# Patient Record
Sex: FEMALE | Race: WHITE | Employment: OTHER | URBAN - METROPOLITAN AREA
[De-identification: names, ages, dates, MRNs, and addresses within clinical notes are randomized per-mention and may not be internally consistent; named-entity substitution may affect disease eponyms.]

---

## 2017-02-14 DIAGNOSIS — Z12.39 BREAST CANCER SCREENING, HIGH RISK PATIENT: Primary | ICD-10-CM

## 2017-03-10 ENCOUNTER — HOSPITAL ENCOUNTER (OUTPATIENT)
Dept: WOMENS IMAGING | Age: 67
Discharge: HOME | End: 2017-03-10
Payer: MEDICARE

## 2017-03-10 DIAGNOSIS — Z13.9 SCREENING: ICD-10-CM

## 2017-03-10 DIAGNOSIS — Z12.39 BREAST CANCER SCREENING, HIGH RISK PATIENT: ICD-10-CM

## 2017-03-10 PROCEDURE — 77063 BREAST TOMOSYNTHESIS BI: CPT

## 2017-03-14 ENCOUNTER — OFFICE VISIT (OUTPATIENT)
Dept: SURGERY | Age: 67
End: 2017-03-14

## 2017-03-14 VITALS
WEIGHT: 138 LBS | TEMPERATURE: 98.3 F | HEIGHT: 62 IN | SYSTOLIC BLOOD PRESSURE: 140 MMHG | RESPIRATION RATE: 14 BRPM | DIASTOLIC BLOOD PRESSURE: 90 MMHG | BODY MASS INDEX: 25.4 KG/M2 | HEART RATE: 80 BPM

## 2017-03-14 DIAGNOSIS — N60.99 ATYPICAL DUCTAL HYPERPLASIA OF BREAST: Primary | ICD-10-CM

## 2017-03-14 PROCEDURE — 1123F ACP DISCUSS/DSCN MKR DOCD: CPT | Performed by: SURGERY

## 2017-03-14 PROCEDURE — G8484 FLU IMMUNIZE NO ADMIN: HCPCS | Performed by: SURGERY

## 2017-03-14 PROCEDURE — G8400 PT W/DXA NO RESULTS DOC: HCPCS | Performed by: SURGERY

## 2017-03-14 PROCEDURE — 3017F COLORECTAL CA SCREEN DOC REV: CPT | Performed by: SURGERY

## 2017-03-14 PROCEDURE — 1090F PRES/ABSN URINE INCON ASSESS: CPT | Performed by: SURGERY

## 2017-03-14 PROCEDURE — 99213 OFFICE O/P EST LOW 20 MIN: CPT | Performed by: SURGERY

## 2017-03-14 PROCEDURE — 3014F SCREEN MAMMO DOC REV: CPT | Performed by: SURGERY

## 2017-03-14 PROCEDURE — G8427 DOCREV CUR MEDS BY ELIG CLIN: HCPCS | Performed by: SURGERY

## 2017-03-14 PROCEDURE — 4040F PNEUMOC VAC/ADMIN/RCVD: CPT | Performed by: SURGERY

## 2017-03-14 PROCEDURE — G8420 CALC BMI NORM PARAMETERS: HCPCS | Performed by: SURGERY

## 2017-03-14 PROCEDURE — 4004F PT TOBACCO SCREEN RCVD TLK: CPT | Performed by: SURGERY

## 2017-03-14 RX ORDER — FERROUS SULFATE 325(65) MG
325 TABLET ORAL
COMMUNITY
End: 2020-12-07

## 2017-03-14 RX ORDER — SODIUM, POTASSIUM,MAG SULFATES 17.5-3.13G
354 SOLUTION, RECONSTITUTED, ORAL ORAL SEE ADMIN INSTRUCTIONS
Qty: 1 BOTTLE | Refills: 0 | Status: SHIPPED | OUTPATIENT
Start: 2017-03-14 | End: 2020-12-07

## 2017-03-14 ASSESSMENT — ENCOUNTER SYMPTOMS
CONSTIPATION: 0
CHEST TIGHTNESS: 0
COLOR CHANGE: 0
SHORTNESS OF BREATH: 0
EYE PAIN: 0
EYE DISCHARGE: 0
ABDOMINAL PAIN: 0
TROUBLE SWALLOWING: 0
VOMITING: 0
STRIDOR: 0
ANAL BLEEDING: 0
BACK PAIN: 0

## 2017-03-31 ENCOUNTER — HOSPITAL ENCOUNTER (OUTPATIENT)
Dept: PREADMISSION TESTING | Age: 67
Discharge: HOME | End: 2017-03-31
Payer: MEDICARE

## 2017-03-31 VITALS
DIASTOLIC BLOOD PRESSURE: 81 MMHG | SYSTOLIC BLOOD PRESSURE: 135 MMHG | HEIGHT: 63 IN | HEART RATE: 65 BPM | TEMPERATURE: 98.6 F | BODY MASS INDEX: 24.24 KG/M2 | WEIGHT: 136.8 LBS | RESPIRATION RATE: 16 BRPM | OXYGEN SATURATION: 99 %

## 2017-03-31 DIAGNOSIS — Z12.11 SCREENING FOR COLORECTAL CANCER: ICD-10-CM

## 2017-03-31 DIAGNOSIS — Z87.891 FORMER SMOKER, STOPPED SMOKING IN DISTANT PAST: ICD-10-CM

## 2017-03-31 DIAGNOSIS — N60.99 ATYPICAL DUCTAL HYPERPLASIA OF BREAST: ICD-10-CM

## 2017-03-31 DIAGNOSIS — Z12.12 SCREENING FOR COLORECTAL CANCER: ICD-10-CM

## 2017-03-31 PROBLEM — E78.2 COMBINED FAT AND CARBOHYDRATE INDUCED HYPERLIPEMIA: Status: ACTIVE | Noted: 2017-03-31

## 2017-03-31 PROBLEM — M81.0 OSTEOPOROSIS: Status: ACTIVE | Noted: 2017-03-31

## 2017-03-31 LAB
ANION GAP SERPL CALCULATED.3IONS-SCNC: 11 MEQ/L (ref 7–13)
BUN BLDV-MCNC: 13 MG/DL (ref 8–23)
CALCIUM SERPL-MCNC: 9.7 MG/DL (ref 8.6–10.2)
CHLORIDE BLD-SCNC: 105 MEQ/L (ref 98–107)
CO2: 26 MEQ/L (ref 22–29)
CREAT SERPL-MCNC: 0.91 MG/DL (ref 0.5–0.9)
GFR AFRICAN AMERICAN: >60
GFR NON-AFRICAN AMERICAN: >60
GLUCOSE BLD-MCNC: 82 MG/DL (ref 74–109)
HCT VFR BLD CALC: 39.6 % (ref 37–47)
HEMOGLOBIN: 13.2 G/DL (ref 12–16)
MCH RBC QN AUTO: 29.9 PG (ref 27–31.3)
MCHC RBC AUTO-ENTMCNC: 33.4 % (ref 33–37)
MCV RBC AUTO: 89.6 FL (ref 82–100)
PDW BLD-RTO: 14.4 % (ref 11.5–14.5)
PLATELET # BLD: 242 K/UL (ref 130–400)
POTASSIUM SERPL-SCNC: 4.3 MEQ/L (ref 3.5–5.1)
RBC # BLD: 4.42 M/UL (ref 4.2–5.4)
SODIUM BLD-SCNC: 142 MEQ/L (ref 132–144)
WBC # BLD: 6.3 K/UL (ref 4.8–10.8)

## 2017-03-31 PROCEDURE — 80048 BASIC METABOLIC PNL TOTAL CA: CPT

## 2017-03-31 PROCEDURE — 93005 ELECTROCARDIOGRAM TRACING: CPT

## 2017-03-31 PROCEDURE — 85027 COMPLETE CBC AUTOMATED: CPT

## 2017-03-31 RX ORDER — ECHINACEA 400 MG
CAPSULE ORAL
Status: ON HOLD | COMMUNITY
Start: 2008-01-10 | End: 2020-12-16 | Stop reason: HOSPADM

## 2017-03-31 RX ORDER — SODIUM CHLORIDE 0.9 % (FLUSH) 0.9 %
10 SYRINGE (ML) INJECTION PRN
Status: CANCELLED | OUTPATIENT
Start: 2017-03-31

## 2017-03-31 RX ORDER — ASCORBATE CALCIUM 500 MG
TABLET ORAL
COMMUNITY
Start: 2006-12-21 | End: 2020-12-07

## 2017-03-31 RX ORDER — FAMOTIDINE 20 MG
TABLET ORAL
Status: ON HOLD | COMMUNITY
Start: 2008-01-10 | End: 2020-12-16 | Stop reason: HOSPADM

## 2017-03-31 RX ORDER — SODIUM CHLORIDE, SODIUM LACTATE, POTASSIUM CHLORIDE, CALCIUM CHLORIDE 600; 310; 30; 20 MG/100ML; MG/100ML; MG/100ML; MG/100ML
INJECTION, SOLUTION INTRAVENOUS CONTINUOUS
Status: CANCELLED | OUTPATIENT
Start: 2017-03-31

## 2017-03-31 RX ORDER — MULTIVIT WITH MINERALS/LUTEIN
TABLET ORAL
Status: ON HOLD | COMMUNITY
Start: 2006-12-21 | End: 2020-12-16 | Stop reason: HOSPADM

## 2017-03-31 RX ORDER — LIDOCAINE HYDROCHLORIDE 10 MG/ML
1 INJECTION, SOLUTION EPIDURAL; INFILTRATION; INTRACAUDAL; PERINEURAL
Status: CANCELLED | OUTPATIENT
Start: 2017-03-31 | End: 2017-03-31

## 2017-03-31 RX ORDER — SODIUM CHLORIDE 0.9 % (FLUSH) 0.9 %
10 SYRINGE (ML) INJECTION EVERY 12 HOURS SCHEDULED
Status: CANCELLED | OUTPATIENT
Start: 2017-03-31

## 2017-04-03 LAB
EKG ATRIAL RATE: 56 BPM
EKG P AXIS: 55 DEGREES
EKG P-R INTERVAL: 140 MS
EKG Q-T INTERVAL: 428 MS
EKG QRS DURATION: 82 MS
EKG QTC CALCULATION (BAZETT): 413 MS
EKG R AXIS: 16 DEGREES
EKG T AXIS: 46 DEGREES
EKG VENTRICULAR RATE: 56 BPM

## 2017-04-12 ENCOUNTER — ANESTHESIA (OUTPATIENT)
Dept: OPERATING ROOM | Age: 67
End: 2017-04-12
Payer: MEDICARE

## 2017-04-12 ENCOUNTER — HOSPITAL ENCOUNTER (OUTPATIENT)
Age: 67
Setting detail: OUTPATIENT SURGERY
Discharge: HOME | End: 2017-04-12
Attending: SURGERY | Admitting: SURGERY
Payer: MEDICARE

## 2017-04-12 ENCOUNTER — ANESTHESIA EVENT (OUTPATIENT)
Dept: OPERATING ROOM | Age: 67
End: 2017-04-12
Payer: MEDICARE

## 2017-04-12 VITALS
SYSTOLIC BLOOD PRESSURE: 107 MMHG | DIASTOLIC BLOOD PRESSURE: 59 MMHG | OXYGEN SATURATION: 100 % | RESPIRATION RATE: 20 BRPM

## 2017-04-12 VITALS
HEART RATE: 76 BPM | OXYGEN SATURATION: 98 % | DIASTOLIC BLOOD PRESSURE: 82 MMHG | TEMPERATURE: 99 F | RESPIRATION RATE: 20 BRPM | SYSTOLIC BLOOD PRESSURE: 123 MMHG

## 2017-04-12 PROCEDURE — 3700000000 HC ANESTHESIA ATTENDED CARE: Performed by: SURGERY

## 2017-04-12 PROCEDURE — 7100000010 HC PHASE II RECOVERY - FIRST 15 MIN: Performed by: SURGERY

## 2017-04-12 PROCEDURE — 2500000003 HC RX 250 WO HCPCS: Performed by: STUDENT IN AN ORGANIZED HEALTH CARE EDUCATION/TRAINING PROGRAM

## 2017-04-12 PROCEDURE — 7100000011 HC PHASE II RECOVERY - ADDTL 15 MIN: Performed by: SURGERY

## 2017-04-12 PROCEDURE — 6360000002 HC RX W HCPCS: Performed by: NURSE ANESTHETIST, CERTIFIED REGISTERED

## 2017-04-12 PROCEDURE — 2500000003 HC RX 250 WO HCPCS: Performed by: NURSE ANESTHETIST, CERTIFIED REGISTERED

## 2017-04-12 PROCEDURE — 2580000003 HC RX 258: Performed by: STUDENT IN AN ORGANIZED HEALTH CARE EDUCATION/TRAINING PROGRAM

## 2017-04-12 PROCEDURE — 3700000001 HC ADD 15 MINUTES (ANESTHESIA): Performed by: SURGERY

## 2017-04-12 PROCEDURE — 3609027000 HC COLONOSCOPY: Performed by: SURGERY

## 2017-04-12 PROCEDURE — 2580000003 HC RX 258: Performed by: SURGERY

## 2017-04-12 RX ORDER — LIDOCAINE HYDROCHLORIDE 20 MG/ML
INJECTION, SOLUTION INFILTRATION; PERINEURAL PRN
Status: DISCONTINUED | OUTPATIENT
Start: 2017-04-12 | End: 2017-04-12 | Stop reason: SDUPTHER

## 2017-04-12 RX ORDER — PROPOFOL 10 MG/ML
INJECTION, EMULSION INTRAVENOUS PRN
Status: DISCONTINUED | OUTPATIENT
Start: 2017-04-12 | End: 2017-04-12 | Stop reason: SDUPTHER

## 2017-04-12 RX ORDER — ONDANSETRON 2 MG/ML
4 INJECTION INTRAMUSCULAR; INTRAVENOUS
Status: DISCONTINUED | OUTPATIENT
Start: 2017-04-12 | End: 2017-04-12 | Stop reason: HOSPADM

## 2017-04-12 RX ORDER — SODIUM CHLORIDE, SODIUM LACTATE, POTASSIUM CHLORIDE, CALCIUM CHLORIDE 600; 310; 30; 20 MG/100ML; MG/100ML; MG/100ML; MG/100ML
INJECTION, SOLUTION INTRAVENOUS CONTINUOUS
Status: DISCONTINUED | OUTPATIENT
Start: 2017-04-12 | End: 2017-04-12 | Stop reason: HOSPADM

## 2017-04-12 RX ORDER — SODIUM CHLORIDE 0.9 % (FLUSH) 0.9 %
10 SYRINGE (ML) INJECTION PRN
Status: DISCONTINUED | OUTPATIENT
Start: 2017-04-12 | End: 2017-04-12 | Stop reason: HOSPADM

## 2017-04-12 RX ORDER — LIDOCAINE HYDROCHLORIDE 10 MG/ML
1 INJECTION, SOLUTION EPIDURAL; INFILTRATION; INTRACAUDAL; PERINEURAL
Status: COMPLETED | OUTPATIENT
Start: 2017-04-12 | End: 2017-04-12

## 2017-04-12 RX ORDER — SODIUM CHLORIDE 0.9 % (FLUSH) 0.9 %
10 SYRINGE (ML) INJECTION EVERY 12 HOURS SCHEDULED
Status: DISCONTINUED | OUTPATIENT
Start: 2017-04-12 | End: 2017-04-12 | Stop reason: HOSPADM

## 2017-04-12 RX ORDER — MAGNESIUM HYDROXIDE 1200 MG/15ML
LIQUID ORAL PRN
Status: DISCONTINUED | OUTPATIENT
Start: 2017-04-12 | End: 2017-04-12 | Stop reason: HOSPADM

## 2017-04-12 RX ORDER — DEXTROSE, SODIUM CHLORIDE, SODIUM LACTATE, POTASSIUM CHLORIDE, AND CALCIUM CHLORIDE 5; .6; .31; .03; .02 G/100ML; G/100ML; G/100ML; G/100ML; G/100ML
INJECTION, SOLUTION INTRAVENOUS CONTINUOUS
Status: CANCELLED | OUTPATIENT
Start: 2017-04-12

## 2017-04-12 RX ADMIN — PROPOFOL 30 MG: 10 INJECTION, EMULSION INTRAVENOUS at 11:13

## 2017-04-12 RX ADMIN — PROPOFOL 20 MG: 10 INJECTION, EMULSION INTRAVENOUS at 11:23

## 2017-04-12 RX ADMIN — PROPOFOL 20 MG: 10 INJECTION, EMULSION INTRAVENOUS at 11:25

## 2017-04-12 RX ADMIN — PROPOFOL 30 MG: 10 INJECTION, EMULSION INTRAVENOUS at 11:11

## 2017-04-12 RX ADMIN — LIDOCAINE HYDROCHLORIDE 0.1 ML: 10 INJECTION, SOLUTION EPIDURAL; INFILTRATION; INTRACAUDAL; PERINEURAL at 09:51

## 2017-04-12 RX ADMIN — PROPOFOL 20 MG: 10 INJECTION, EMULSION INTRAVENOUS at 11:15

## 2017-04-12 RX ADMIN — PROPOFOL 40 MG: 10 INJECTION, EMULSION INTRAVENOUS at 11:09

## 2017-04-12 RX ADMIN — PROPOFOL 20 MG: 10 INJECTION, EMULSION INTRAVENOUS at 11:21

## 2017-04-12 RX ADMIN — PROPOFOL 20 MG: 10 INJECTION, EMULSION INTRAVENOUS at 11:19

## 2017-04-12 RX ADMIN — PROPOFOL 20 MG: 10 INJECTION, EMULSION INTRAVENOUS at 11:27

## 2017-04-12 RX ADMIN — LIDOCAINE HYDROCHLORIDE 50 MG: 20 INJECTION, SOLUTION INFILTRATION; PERINEURAL at 11:09

## 2017-04-12 RX ADMIN — SODIUM CHLORIDE, POTASSIUM CHLORIDE, SODIUM LACTATE AND CALCIUM CHLORIDE: 600; 310; 30; 20 INJECTION, SOLUTION INTRAVENOUS at 09:51

## 2017-04-12 RX ADMIN — PROPOFOL 20 MG: 10 INJECTION, EMULSION INTRAVENOUS at 11:17

## 2017-04-12 ASSESSMENT — PAIN SCALES - GENERAL
PAINLEVEL_OUTOF10: 0

## 2017-04-12 ASSESSMENT — PAIN - FUNCTIONAL ASSESSMENT: PAIN_FUNCTIONAL_ASSESSMENT: 0-10

## 2018-03-12 ENCOUNTER — HOSPITAL ENCOUNTER (OUTPATIENT)
Dept: WOMENS IMAGING | Age: 68
Discharge: HOME | End: 2018-03-14
Payer: MEDICARE

## 2018-03-12 DIAGNOSIS — N60.99 ATYPICAL DUCTAL HYPERPLASIA OF BREAST: ICD-10-CM

## 2018-03-12 DIAGNOSIS — Z12.31 ENCOUNTER FOR SCREENING MAMMOGRAM FOR BREAST CANCER: ICD-10-CM

## 2018-03-12 DIAGNOSIS — Z12.39 SCREENING BREAST EXAMINATION: Primary | ICD-10-CM

## 2018-03-12 PROCEDURE — 77067 SCR MAMMO BI INCL CAD: CPT

## 2018-03-12 NOTE — PROGRESS NOTES
Jesse Flores called stating pt's screening Dx was missing. New order made - pt at facility at time of call.

## 2018-03-15 ENCOUNTER — OFFICE VISIT (OUTPATIENT)
Dept: SURGERY | Age: 68
End: 2018-03-15
Payer: MEDICARE

## 2018-03-15 VITALS
DIASTOLIC BLOOD PRESSURE: 80 MMHG | BODY MASS INDEX: 24.69 KG/M2 | TEMPERATURE: 97.8 F | SYSTOLIC BLOOD PRESSURE: 139 MMHG | WEIGHT: 137.2 LBS | HEART RATE: 62 BPM

## 2018-03-15 DIAGNOSIS — Z12.39 SCREENING BREAST EXAMINATION: Primary | ICD-10-CM

## 2018-03-15 DIAGNOSIS — N60.99 ATYPICAL DUCTAL HYPERPLASIA OF BREAST: Primary | ICD-10-CM

## 2018-03-15 PROCEDURE — G8400 PT W/DXA NO RESULTS DOC: HCPCS | Performed by: SURGERY

## 2018-03-15 PROCEDURE — 4040F PNEUMOC VAC/ADMIN/RCVD: CPT | Performed by: SURGERY

## 2018-03-15 PROCEDURE — G8484 FLU IMMUNIZE NO ADMIN: HCPCS | Performed by: SURGERY

## 2018-03-15 PROCEDURE — G8427 DOCREV CUR MEDS BY ELIG CLIN: HCPCS | Performed by: SURGERY

## 2018-03-15 PROCEDURE — 1123F ACP DISCUSS/DSCN MKR DOCD: CPT | Performed by: SURGERY

## 2018-03-15 PROCEDURE — 3014F SCREEN MAMMO DOC REV: CPT | Performed by: SURGERY

## 2018-03-15 PROCEDURE — 3017F COLORECTAL CA SCREEN DOC REV: CPT | Performed by: SURGERY

## 2018-03-15 PROCEDURE — G8420 CALC BMI NORM PARAMETERS: HCPCS | Performed by: SURGERY

## 2018-03-15 PROCEDURE — 1090F PRES/ABSN URINE INCON ASSESS: CPT | Performed by: SURGERY

## 2018-03-15 PROCEDURE — 1036F TOBACCO NON-USER: CPT | Performed by: SURGERY

## 2018-03-15 PROCEDURE — 99213 OFFICE O/P EST LOW 20 MIN: CPT | Performed by: SURGERY

## 2018-03-15 ASSESSMENT — ENCOUNTER SYMPTOMS
SHORTNESS OF BREATH: 0
CONSTIPATION: 0
VOMITING: 0
COLOR CHANGE: 0
BACK PAIN: 0
TROUBLE SWALLOWING: 0
STRIDOR: 0
EYE DISCHARGE: 0
EYE PAIN: 0
ABDOMINAL PAIN: 0
ANAL BLEEDING: 0
CHEST TIGHTNESS: 0

## 2018-03-15 NOTE — PROGRESS NOTES
Problem Relation Age of Onset    Dementia Mother     High Blood Pressure Mother     High Blood Pressure Father     High Cholesterol Father     Emphysema Father     Cancer Father      lung    No Known Problems Sister          Current Outpatient Prescriptions   Medication Sig Dispense Refill    Multiple Vitamins-Minerals (CENTRUM SILVER) TABS Take by mouth      Vitamin D, Cholecalciferol, 1000 UNITS CAPS Take by mouth      Flaxseed, Linseed, (FLAXSEED OIL) 1000 MG CAPS Take by mouth      Calcium Ascorbate 500 MG TABS Take by mouth      oxybutynin (DITROPAN) 5 MG tablet Take 5 mg by mouth 3 times daily      olmesartan (BENICAR) 20 MG tablet Take 20 mg by mouth daily.  pantoprazole (PROTONIX) 40 MG tablet Take 40 mg by mouth daily.  atorvastatin (LIPITOR) 20 MG tablet Take 20 mg by mouth daily.  Calcium Carb-Cholecalciferol (CALCIUM + D3) 600-200 MG-UNIT TABS tablet Take by mouth      ferrous sulfate (IRON SUPPLEMENT) 325 (65 FE) MG tablet Take 325 mg by mouth daily (with breakfast)      Na Sulfate-K Sulfate-Mg Sulf (SUPREP BOWEL PREP) SOLN Take 354 mLs by mouth See Admin Instructions Take one kit for prep for one day. 1 Bottle 0     No current facility-administered medications for this visit. Allergies   Allergen Reactions    Demerol Other (See Comments)     Vomiting    Medrol [Methylprednisolone] Itching    Bactrim [Sulfamethoxazole-Trimethoprim] Rash     Facial flushing , blotches on chest    Ciprofloxacin Rash    Oxycodone-Acetaminophen Rash    Sulfa Antibiotics Rash         Review of Systems   Constitutional: Negative for chills, fatigue, fever and unexpected weight change. HENT: Negative for nosebleeds and trouble swallowing. Eyes: Negative for pain and discharge. Respiratory: Negative for chest tightness, shortness of breath and stridor. Cardiovascular: Negative for chest pain, palpitations and leg swelling.    Gastrointestinal: Negative for abdominal

## 2018-09-26 PROBLEM — Z12.12 SCREENING FOR COLORECTAL CANCER: Status: RESOLVED | Noted: 2017-03-31 | Resolved: 2018-09-26

## 2018-09-26 PROBLEM — Z12.11 SCREENING FOR COLORECTAL CANCER: Status: RESOLVED | Noted: 2017-03-31 | Resolved: 2018-09-26

## 2020-12-03 ENCOUNTER — HOSPITAL ENCOUNTER (EMERGENCY)
Age: 70
Discharge: HOME | End: 2020-12-03
Payer: MEDICARE

## 2020-12-03 ENCOUNTER — APPOINTMENT (OUTPATIENT)
Dept: CT IMAGING | Age: 70
End: 2020-12-03
Payer: MEDICARE

## 2020-12-03 VITALS
HEART RATE: 68 BPM | DIASTOLIC BLOOD PRESSURE: 58 MMHG | BODY MASS INDEX: 25.34 KG/M2 | TEMPERATURE: 97.6 F | RESPIRATION RATE: 20 BRPM | WEIGHT: 143 LBS | HEIGHT: 63 IN | SYSTOLIC BLOOD PRESSURE: 121 MMHG | OXYGEN SATURATION: 99 %

## 2020-12-03 LAB
ALBUMIN SERPL-MCNC: 4.3 G/DL (ref 3.5–4.6)
ALP BLD-CCNC: 98 U/L (ref 40–130)
ALT SERPL-CCNC: 14 U/L (ref 0–33)
ANION GAP SERPL CALCULATED.3IONS-SCNC: 15 MEQ/L (ref 9–15)
AST SERPL-CCNC: 23 U/L (ref 0–35)
BACTERIA: NEGATIVE /HPF
BASOPHILS ABSOLUTE: 0.1 K/UL (ref 0–0.2)
BASOPHILS RELATIVE PERCENT: 0.7 %
BILIRUB SERPL-MCNC: 0.3 MG/DL (ref 0.2–0.7)
BILIRUBIN URINE: NEGATIVE
BLOOD, URINE: NEGATIVE
BUN BLDV-MCNC: 13 MG/DL (ref 8–23)
CALCIUM SERPL-MCNC: 9.2 MG/DL (ref 8.5–9.9)
CHLORIDE BLD-SCNC: 105 MEQ/L (ref 95–107)
CLARITY: ABNORMAL
CO2: 21 MEQ/L (ref 20–31)
COLOR: YELLOW
CREAT SERPL-MCNC: 0.87 MG/DL (ref 0.5–0.9)
EOSINOPHILS ABSOLUTE: 0.1 K/UL (ref 0–0.7)
EOSINOPHILS RELATIVE PERCENT: 1.6 %
EPITHELIAL CELLS, UA: ABNORMAL /HPF (ref 0–5)
GFR AFRICAN AMERICAN: >60
GFR AFRICAN AMERICAN: >60
GFR NON-AFRICAN AMERICAN: >60
GFR NON-AFRICAN AMERICAN: >60
GLOBULIN: 2.8 G/DL (ref 2.3–3.5)
GLUCOSE BLD-MCNC: 119 MG/DL (ref 70–99)
GLUCOSE URINE: NEGATIVE MG/DL
HCT VFR BLD CALC: 37.7 % (ref 37–47)
HEMOGLOBIN: 13.2 G/DL (ref 12–16)
HYALINE CASTS: ABNORMAL /HPF (ref 0–5)
KETONES, URINE: NEGATIVE MG/DL
LACTIC ACID: 3.9 MMOL/L (ref 0.5–2.2)
LEUKOCYTE ESTERASE, URINE: ABNORMAL
LIPASE: 50 U/L (ref 12–95)
LYMPHOCYTES ABSOLUTE: 3 K/UL (ref 1–4.8)
LYMPHOCYTES RELATIVE PERCENT: 42.7 %
MCH RBC QN AUTO: 31.8 PG (ref 27–31.3)
MCHC RBC AUTO-ENTMCNC: 34.9 % (ref 33–37)
MCV RBC AUTO: 91.1 FL (ref 82–100)
MONOCYTES ABSOLUTE: 0.6 K/UL (ref 0.2–0.8)
MONOCYTES RELATIVE PERCENT: 8.7 %
NEUTROPHILS ABSOLUTE: 3.2 K/UL (ref 1.4–6.5)
NEUTROPHILS RELATIVE PERCENT: 46.3 %
NITRITE, URINE: NEGATIVE
PDW BLD-RTO: 13.7 % (ref 11.5–14.5)
PERFORMED ON: NORMAL
PH UA: >=9 (ref 5–9)
PLATELET # BLD: 261 K/UL (ref 130–400)
POC CREATININE: 0.9 MG/DL (ref 0.6–1.2)
POC SAMPLE TYPE: NORMAL
POTASSIUM SERPL-SCNC: 3.4 MEQ/L (ref 3.4–4.9)
PROTEIN UA: ABNORMAL MG/DL
RBC # BLD: 4.14 M/UL (ref 4.2–5.4)
SODIUM BLD-SCNC: 141 MEQ/L (ref 135–144)
SPECIFIC GRAVITY UA: 1.02 (ref 1–1.03)
TOTAL PROTEIN: 7.1 G/DL (ref 6.3–8)
URINE REFLEX TO CULTURE: YES
UROBILINOGEN, URINE: 0.2 E.U./DL
WBC # BLD: 6.9 K/UL (ref 4.8–10.8)
WBC UA: ABNORMAL /HPF (ref 0–5)

## 2020-12-03 PROCEDURE — 87086 URINE CULTURE/COLONY COUNT: CPT

## 2020-12-03 PROCEDURE — 96375 TX/PRO/DX INJ NEW DRUG ADDON: CPT

## 2020-12-03 PROCEDURE — 83690 ASSAY OF LIPASE: CPT

## 2020-12-03 PROCEDURE — 80053 COMPREHEN METABOLIC PANEL: CPT

## 2020-12-03 PROCEDURE — 6360000002 HC RX W HCPCS: Performed by: PHYSICIAN ASSISTANT

## 2020-12-03 PROCEDURE — 6360000004 HC RX CONTRAST MEDICATION: Performed by: PHYSICIAN ASSISTANT

## 2020-12-03 PROCEDURE — 36415 COLL VENOUS BLD VENIPUNCTURE: CPT

## 2020-12-03 PROCEDURE — 96376 TX/PRO/DX INJ SAME DRUG ADON: CPT

## 2020-12-03 PROCEDURE — 2580000003 HC RX 258: Performed by: PHYSICIAN ASSISTANT

## 2020-12-03 PROCEDURE — 85025 COMPLETE CBC W/AUTO DIFF WBC: CPT

## 2020-12-03 PROCEDURE — 96374 THER/PROPH/DIAG INJ IV PUSH: CPT

## 2020-12-03 PROCEDURE — 99285 EMERGENCY DEPT VISIT HI MDM: CPT

## 2020-12-03 PROCEDURE — 74177 CT ABD & PELVIS W/CONTRAST: CPT

## 2020-12-03 PROCEDURE — 83605 ASSAY OF LACTIC ACID: CPT

## 2020-12-03 PROCEDURE — 2500000003 HC RX 250 WO HCPCS: Performed by: PHYSICIAN ASSISTANT

## 2020-12-03 PROCEDURE — 81003 URINALYSIS AUTO W/O SCOPE: CPT

## 2020-12-03 RX ORDER — ONDANSETRON 4 MG/1
4 TABLET, ORALLY DISINTEGRATING ORAL EVERY 8 HOURS PRN
Qty: 20 TABLET | Refills: 0 | Status: SHIPPED | OUTPATIENT
Start: 2020-12-03

## 2020-12-03 RX ORDER — 0.9 % SODIUM CHLORIDE 0.9 %
1000 INTRAVENOUS SOLUTION INTRAVENOUS ONCE
Status: COMPLETED | OUTPATIENT
Start: 2020-12-03 | End: 2020-12-03

## 2020-12-03 RX ORDER — MORPHINE SULFATE 4 MG/ML
4 INJECTION, SOLUTION INTRAMUSCULAR; INTRAVENOUS ONCE
Status: COMPLETED | OUTPATIENT
Start: 2020-12-03 | End: 2020-12-03

## 2020-12-03 RX ORDER — ONDANSETRON 2 MG/ML
4 INJECTION INTRAMUSCULAR; INTRAVENOUS ONCE
Status: COMPLETED | OUTPATIENT
Start: 2020-12-03 | End: 2020-12-03

## 2020-12-03 RX ORDER — KETOROLAC TROMETHAMINE 30 MG/ML
30 INJECTION, SOLUTION INTRAMUSCULAR; INTRAVENOUS ONCE
Status: COMPLETED | OUTPATIENT
Start: 2020-12-03 | End: 2020-12-03

## 2020-12-03 RX ORDER — NAPROXEN 500 MG/1
500 TABLET ORAL 2 TIMES DAILY
Qty: 14 TABLET | Refills: 0 | Status: SHIPPED | OUTPATIENT
Start: 2020-12-03 | End: 2020-12-07

## 2020-12-03 RX ORDER — HYDROCODONE BITARTRATE AND ACETAMINOPHEN 5; 325 MG/1; MG/1
1 TABLET ORAL EVERY 6 HOURS PRN
Qty: 10 TABLET | Refills: 0 | Status: SHIPPED | OUTPATIENT
Start: 2020-12-03 | End: 2020-12-06

## 2020-12-03 RX ADMIN — ONDANSETRON 4 MG: 2 INJECTION INTRAMUSCULAR; INTRAVENOUS at 16:22

## 2020-12-03 RX ADMIN — SODIUM CHLORIDE 1000 ML: 9 INJECTION, SOLUTION INTRAVENOUS at 16:22

## 2020-12-03 RX ADMIN — MORPHINE SULFATE 4 MG: 4 INJECTION, SOLUTION INTRAMUSCULAR; INTRAVENOUS at 16:28

## 2020-12-03 RX ADMIN — IOPAMIDOL 100 ML: 612 INJECTION, SOLUTION INTRAVENOUS at 17:32

## 2020-12-03 RX ADMIN — MORPHINE SULFATE 4 MG: 4 INJECTION, SOLUTION INTRAMUSCULAR; INTRAVENOUS at 16:22

## 2020-12-03 RX ADMIN — KETOROLAC TROMETHAMINE 30 MG: 30 INJECTION, SOLUTION INTRAMUSCULAR at 16:22

## 2020-12-03 RX ADMIN — FAMOTIDINE 20 MG: 10 INJECTION INTRAVENOUS at 16:46

## 2020-12-03 ASSESSMENT — PAIN DESCRIPTION - DESCRIPTORS
DESCRIPTORS: ACHING
DESCRIPTORS: DISCOMFORT

## 2020-12-03 ASSESSMENT — PAIN DESCRIPTION - LOCATION
LOCATION: BACK;ABDOMEN;FLANK
LOCATION: ABDOMEN

## 2020-12-03 ASSESSMENT — ENCOUNTER SYMPTOMS
SHORTNESS OF BREATH: 0
ALLERGIC/IMMUNOLOGIC NEGATIVE: 1
ABDOMINAL PAIN: 1
VOMITING: 1
COLOR CHANGE: 0
TROUBLE SWALLOWING: 0
APNEA: 0
NAUSEA: 1
BACK PAIN: 1
EYE PAIN: 0

## 2020-12-03 ASSESSMENT — PAIN DESCRIPTION - FREQUENCY
FREQUENCY: CONTINUOUS
FREQUENCY: CONTINUOUS

## 2020-12-03 ASSESSMENT — PAIN SCALES - GENERAL
PAINLEVEL_OUTOF10: 9
PAINLEVEL_OUTOF10: 4

## 2020-12-03 ASSESSMENT — PAIN DESCRIPTION - PAIN TYPE
TYPE: ACUTE PAIN
TYPE: ACUTE PAIN

## 2020-12-03 ASSESSMENT — PAIN DESCRIPTION - ONSET
ONSET: ON-GOING
ONSET: ON-GOING

## 2020-12-03 ASSESSMENT — PAIN DESCRIPTION - PROGRESSION
CLINICAL_PROGRESSION: GRADUALLY IMPROVING
CLINICAL_PROGRESSION: GRADUALLY WORSENING

## 2020-12-03 ASSESSMENT — PAIN DESCRIPTION - ORIENTATION
ORIENTATION: RIGHT
ORIENTATION: RIGHT

## 2020-12-03 ASSESSMENT — PAIN SCALES - WONG BAKER: WONGBAKER_NUMERICALRESPONSE: 0

## 2020-12-03 NOTE — ED TRIAGE NOTES
Patient arrived from home via [facility] with complaint of abd pain x1 hour radiating to right flank and right back. Patient A&OX4. Skin pink, warm, and dry. msp intact. Patient denies chest pain and sob.  Patient actively vomiting upon arrival.

## 2020-12-03 NOTE — ED NOTES
Patient amb to bathroom with steady gait. Urine sample obtained and sent to lab.       Ayala Juarez RN  12/03/20 5050

## 2020-12-03 NOTE — ED PROVIDER NOTES
[facility] ED  eMERGENCYdEPARTMENT eNCOUnter      Pt Name: Angela Beltre  MRN: [id number]  [place] 1950  Date of evaluation: 12/3/2020  Provider:Fuad Weller PA-C    CHIEF COMPLAINT       Chief Complaint   Patient presents with    Flank Pain     right side flank pain, abd pain, and back pain x1 hour. with emesis. HISTORY OF PRESENT ILLNESS  (Location/Symptom, Timing/Onset, Context/Setting, Quality, Duration, Modifying Factors, Severity.)   Angela Beltre is a 79 y.o. female who presents to the emergency department with complaints of diffuse right-sided abdominal pain that radiates into the right flank and back, ongoing since approximately 3:30 PM.  Patient states that the pain with sudden onset and associated with severe nausea. Patient did have emesis prior to coming to the emergency room. Patient points to the diffuse right side of the abdomen as the location of her pain. Patient describes it as a sharp and cramping type of pain. Patient denies any recent chest pain, palpitations or shortness of breath. Patient is vomiting on initial ED assessment    HPI    Nursing Notes were reviewed and I agree. REVIEW OF SYSTEMS    (2-9 systems for level 4, 10 or more for level 5)     Review of Systems   Constitutional: Negative for diaphoresis and fever. HENT: Negative for hearing loss and trouble swallowing. Eyes: Negative for pain. Respiratory: Negative for apnea and shortness of breath. Cardiovascular: Negative for chest pain. Gastrointestinal: Positive for abdominal pain, nausea and vomiting. Endocrine: Negative. Genitourinary: Positive for flank pain. Negative for hematuria. Musculoskeletal: Positive for back pain. Negative for neck pain and neck stiffness. Skin: Negative for color change. Allergic/Immunologic: Negative. Neurological: Negative for dizziness and numbness. Hematological: Negative. Psychiatric/Behavioral: Negative.     All other systems reviewed and are negative. Except as noted above the remainder of the review of systems was reviewed and negative. PAST MEDICAL HISTORY     Past Medical History:   Diagnosis Date    Arthritis     Hyperlipidemia     meds > 10 yrs    Hypertension     meds > 10 yrs / denies TIA or stroke         SURGICAL HISTORY       Past Surgical History:   Procedure Laterality Date    BREAST SURGERY      biopsy right breast    COLONOSCOPY      2 previous colonscopy polyps    COLONOSCOPY  04/12/2017    DR [place]    DILATION AND CURETTAGE OF UTERUS      endometriosis / several D & C    IA COLON CA SCRN NOT HI RSK IND N/A 4/12/2017    COLONOSCOPY, PAT DONE  performed by Daisy Caballero MD at [address] [place] [address] SURGERY Right     big toe spur    TONSILLECTOMY           CURRENT MEDICATIONS       Previous Medications    ATORVASTATIN (LIPITOR) 20 MG TABLET    Take 20 mg by mouth daily. CALCIUM ASCORBATE 500 MG TABS    Take by mouth    CALCIUM CARB-CHOLECALCIFEROL (CALCIUM + D3) 600-200 MG-UNIT TABS TABLET    Take by mouth    FERROUS SULFATE (IRON SUPPLEMENT) 325 (65 FE) MG TABLET    Take 325 mg by mouth daily (with breakfast)    FLAXSEED, LINSEED, (FLAXSEED OIL) 1000 MG CAPS    Take by mouth    MULTIPLE VITAMINS-MINERALS (CENTRUM SILVER) TABS    Take by mouth    NA SULFATE-K SULFATE-MG SULF (SUPREP BOWEL PREP) SOLN    Take 354 mLs by mouth See Admin Instructions Take one kit for prep for one day. OLMESARTAN (BENICAR) 20 MG TABLET    Take 20 mg by mouth daily. OXYBUTYNIN (DITROPAN) 5 MG TABLET    Take 5 mg by mouth 3 times daily    PANTOPRAZOLE (PROTONIX) 40 MG TABLET    Take 40 mg by mouth daily. VITAMIN D, CHOLECALCIFEROL, 1000 UNITS CAPS    Take by mouth       ALLERGIES     Demerol; Medrol [methylprednisolone]; Bactrim [sulfamethoxazole-trimethoprim]; Ciprofloxacin;  Oxycodone-acetaminophen; and Sulfa antibiotics    FAMILY HISTORY       Family History   Problem Relation Age of Onset    Dementia Mother     High Blood Pressure Mother     High Blood Pressure Father     High Cholesterol Father     Emphysema Father     Cancer Father         lung    No Known Problems Sister           SOCIAL HISTORY       Social History     Socioeconomic History    Marital status:      Spouse name: None    Number of children: None    Years of education: None    Highest education level: None   Occupational History    None   Social Needs    Financial resource strain: None    Food insecurity     Worry: None     Inability: None    Transportation needs     Medical: None     Non-medical: None   Tobacco Use    Smoking status: Former Smoker     Last attempt to quit: 2008     Years since quittin.5    Smokeless tobacco: Never Used   Substance and Sexual Activity    Alcohol use: Yes     Comment: rare    Drug use: No    Sexual activity: None   Lifestyle    Physical activity     Days per week: None     Minutes per session: None    Stress: None   Relationships    Social connections     Talks on phone: None     Gets together: None     Attends Jain service: None     Active member of club or organization: None     Attends meetings of clubs or organizations: None     Relationship status: None    Intimate partner violence     Fear of current or ex partner: None     Emotionally abused: None     Physically abused: None     Forced sexual activity: None   Other Topics Concern    None   Social History Narrative    None       SCREENINGS           PHYSICAL EXAM    (up to 7 forlevel 4, 8 or more for level 5)     ED Triage Vitals [20 1614]   BP Temp Temp Source Pulse Resp SpO2 Height Weight   (!) 144/93 97.6 °F (36.4 °C) Oral 98 20 100 % 5' 2.5\" (1.588 m) 143 lb (64.9 kg)       Physical Exam  Vitals signs and nursing note reviewed. Constitutional:       General: She is not in acute distress. Appearance: She is well-developed. She is not diaphoretic. HENT:      Head: Normocephalic and atraumatic. Mouth/Throat:      Pharynx: No oropharyngeal exudate. Eyes:      General: No scleral icterus. Conjunctiva/sclera: Conjunctivae normal.      Pupils: Pupils are equal, round, and reactive to light. Neck:      Musculoskeletal: Normal range of motion and neck supple. Trachea: No tracheal deviation. Cardiovascular:      Rate and Rhythm: Normal rate. Heart sounds: Normal heart sounds. Pulmonary:      Effort: Pulmonary effort is normal. No respiratory distress. Breath sounds: Normal breath sounds. Abdominal:      General: Bowel sounds are normal. There is no distension. Palpations: Abdomen is soft. Tenderness: There is abdominal tenderness in the right upper quadrant and right lower quadrant. There is right CVA tenderness. Musculoskeletal: Normal range of motion. Skin:     General: Skin is warm and dry. Findings: No erythema or rash. Neurological:      Mental Status: She is alert and oriented to person, place, and time. Cranial Nerves: No cranial nerve deficit. Motor: No abnormal muscle tone. Psychiatric:         Behavior: Behavior normal.         Thought Content: Thought content normal.         Judgment: Judgment normal.           DIAGNOSTIC RESULTS     RADIOLOGY:   Non-plain film images such as CT, Ultrasound and MRI are read by the radiologist. Plain radiographic images are visualized and preliminarilyinterpreted by Zuleyka Correia PA-C with the below findings:      Interpretation per the Radiologist below, if available at the time of this note:    CT ABDOMEN PELVIS W IV CONTRAST Additional Contrast? None   Final Result   SLIGHT [address] 1 CM BLADDER MASS  Critical access hospital                     LABS:  Labs Reviewed   COMPREHENSIVE METABOLIC PANEL - Abnormal; Notable for the following components:       Result Value    Glucose 119 (*)     All other components within normal limits   CBC WITH AUTO DIFFERENTIAL - Abnormal; Notable for the following components:    RBC 4.14 (*)     MCH 31.8 (*)     All other components within normal limits   LACTIC ACID, PLASMA - Abnormal; Notable for the following components:    Lactic Acid 3.9 (*)     All other components within normal limits    Narrative:     Ruthie Jorge tel. [phone],  DARRICK results called to and read back by Dr. Vargas Razo, 12/03/2020 17:11, by  Darren Maya   URINE RT REFLEX TO CULTURE - Abnormal; Notable for the following components:    Clarity, UA CLOUDY (*)     Protein, UA TRACE (*)     Leukocyte Esterase, Urine SMALL (*)     All other components within normal limits   LIPASE   MICROSCOPIC URINALYSIS   POCT VENOUS       All other labs were within normal range or not returnedas of this dictation. EMERGENCYDEPARTMENT COURSE and DIFFERENTIAL DIAGNOSIS/MDM:   Vitals:    Vitals:    12/03/20 1614 12/03/20 1630 12/03/20 1727 12/03/20 1822   BP: (!) 144/93 (!) 145/75 133/61 (!) 121/58   Pulse: 98 59 62 68   Resp: 20 10 18 20   Temp: 97.6 °F (36.4 °C)      TempSrc: Oral      SpO2: 100% 100% 98% 99%   Weight: 143 lb (64.9 kg)      Height: 5' 2.5\" (1.588 m)          REASSESSMENT      Patient presented emergency department with complaints of sudden onset of right-sided abdominal pain that radiates into the flank. Patient had resolution of symptoms while in the emergency department. CT shows a 1 cm bladder mass at the UVJ of the right side. I discussed with patient possibility of this being a stone that is passed through the ureter given the amount of pain and spontaneous resolution of symptoms. Patient will be discharged home with pain medicine, antiemetics and referred to urology for outpatient follow-up. In case this is a stone the patient will be given a urine strainer and collection cup. Return for any worsening symptoms    MDM    PROCEDURES:    Procedures      FINAL IMPRESSION      1. Right flank pain    2.  Kidney stone DISPOSITION/PLAN   DISPOSITION Decision To Discharge 12/03/2020 06:37:29 PM      PATIENT REFERRED TO:  Bacilio Lorenz MD  [address]  [phone]    Call in 1 day        DISCHARGE MEDICATIONS:  New Prescriptions    HYDROCODONE-ACETAMINOPHEN (NORCO) 5-325 MG PER TABLET    Take 1 tablet by mouth every 6 hours as needed for Pain for up to 3 days. Intended supply: 3 days.  Take lowest dose possible to manage pain    NAPROXEN (NAPROSYN) 500 MG TABLET    Take 1 tablet by mouth 2 times daily    ONDANSETRON (ZOFRAN ODT) 4 MG DISINTEGRATING TABLET    Take 1 tablet by mouth every 8 hours as needed for Nausea       (Please note that portions of this note were completed with a voice recognition program.  Efforts were made to edit the dictations but occasionally words are mis-transcribed.)    JONATHAN Sharma PA-C  12/03/20 8383

## 2020-12-05 LAB — URINE CULTURE, ROUTINE: NORMAL

## 2020-12-07 ENCOUNTER — OFFICE VISIT (OUTPATIENT)
Dept: UROLOGY | Age: 70
End: 2020-12-07
Payer: MEDICARE

## 2020-12-07 VITALS
BODY MASS INDEX: 23.57 KG/M2 | WEIGHT: 133 LBS | HEART RATE: 67 BPM | DIASTOLIC BLOOD PRESSURE: 100 MMHG | SYSTOLIC BLOOD PRESSURE: 142 MMHG | HEIGHT: 63 IN

## 2020-12-07 PROCEDURE — 1090F PRES/ABSN URINE INCON ASSESS: CPT | Performed by: UROLOGY

## 2020-12-07 PROCEDURE — G8420 CALC BMI NORM PARAMETERS: HCPCS | Performed by: UROLOGY

## 2020-12-07 PROCEDURE — 3017F COLORECTAL CA SCREEN DOC REV: CPT | Performed by: UROLOGY

## 2020-12-07 PROCEDURE — 4040F PNEUMOC VAC/ADMIN/RCVD: CPT | Performed by: UROLOGY

## 2020-12-07 PROCEDURE — G8427 DOCREV CUR MEDS BY ELIG CLIN: HCPCS | Performed by: UROLOGY

## 2020-12-07 PROCEDURE — 1123F ACP DISCUSS/DSCN MKR DOCD: CPT | Performed by: UROLOGY

## 2020-12-07 PROCEDURE — 99203 OFFICE O/P NEW LOW 30 MIN: CPT | Performed by: UROLOGY

## 2020-12-07 PROCEDURE — G8484 FLU IMMUNIZE NO ADMIN: HCPCS | Performed by: UROLOGY

## 2020-12-07 PROCEDURE — 1036F TOBACCO NON-USER: CPT | Performed by: UROLOGY

## 2020-12-07 PROCEDURE — G8400 PT W/DXA NO RESULTS DOC: HCPCS | Performed by: UROLOGY

## 2020-12-07 RX ORDER — ASCORBIC ACID 500 MG
500 TABLET ORAL DAILY
Status: ON HOLD | COMMUNITY
End: 2020-12-16 | Stop reason: HOSPADM

## 2020-12-07 RX ORDER — CHLORAL HYDRATE 500 MG
3000 CAPSULE ORAL 3 TIMES DAILY
Status: ON HOLD | COMMUNITY
End: 2020-12-16 | Stop reason: HOSPADM

## 2020-12-07 NOTE — PROGRESS NOTES
[facility] EVALUATION NOTE                                                 H&P                                                                                                                                                 Reason for Visit  Right flank pain    History of Present Illness  27-year-old female with no previous history of kidney stones or hematuria was seen in the emergency room for acute right renal colic type pain with right flank pain and right groin pain  The pain resolved once she was in emergency room  Patient has a small tan in a urine strainer may be a stone that she may have passed  On CT however she was noted to have questionable filling defect in the bladder consistent with a bladder mass   Patient is a former smoker quit 20 years ago  No history of vaginal deliveries  No gynecologic history  Patient has vaginal atrophy        Urologic Review of Systems/Symptoms  Denies gross hematuria or previous history of stones    Review of Systems  Hospitalization: None  All 14 categories of Review of Systems otherwise reviewed no other findings reported.     Past Medical History:   Diagnosis Date    Arthritis     Hyperlipidemia     meds > 10 yrs    Hypertension     meds > 10 yrs / denies TIA or stroke     Past Surgical History:   Procedure Laterality Date    BREAST SURGERY      biopsy right breast    COLONOSCOPY      2 previous colonscopy polyps    [facility]    DILATION AND CURETTAGE OF UTERUS      endometriosis / several D & C    ME COLON CA SCRN NOT HI RSK IND N/A 4/12/2017    COLONOSCOPY, PAT DONE  performed by Aj Smith MD at [address] [place] [address] TOE SURGERY Right     big toe spur    TONSILLECTOMY       Social History     Socioeconomic History    Marital status:      Spouse name: None    Number of children: None    Years of education: None    Highest education level: None   Occupational History    None   Social Needs    Financial resource strain: None    Food insecurity     Worry: None     Inability: None    Transportation needs     Medical: None     Non-medical: None   Tobacco Use    Smoking status: Former Smoker     Last attempt to quit: 2008     Years since quittin.5    Smokeless tobacco: Never Used   Substance and Sexual Activity    Alcohol use: Yes     Comment: rare    Drug use: No    Sexual activity: None   Lifestyle    Physical activity     Days per week: None     Minutes per session: None    Stress: None   Relationships    Social connections     Talks on phone: None     Gets together: None     Attends Muslim service: None     Active member of club or organization: None     Attends meetings of clubs or organizations: None     Relationship status: None    Intimate partner violence     Fear of current or ex partner: None     Emotionally abused: None     Physically abused: None     Forced sexual activity: None   Other Topics Concern    None   Social History Narrative    None     Family History   Problem Relation Age of Onset    Dementia Mother     High Blood Pressure Mother     High Blood Pressure Father     High Cholesterol Father     Emphysema Father     Cancer Father         lung    No Known Problems Sister      Current Outpatient Medications   Medication Sig Dispense Refill    Omega-3 Fatty Acids (FISH OIL) 1000 MG CAPS Take 3,000 mg by mouth 3 times daily      vitamin C (ASCORBIC ACID) 500 MG tablet Take 500 mg by mouth daily      Vaginal Lubricant (REPLENS VA) Place vaginally      ondansetron (ZOFRAN ODT) 4 MG disintegrating tablet Take 1 tablet by mouth every 8 hours as needed for Nausea 20 tablet 0    Multiple Vitamins-Minerals (CENTRUM SILVER) TABS Take by mouth      Vitamin D, Cholecalciferol, 1000 UNITS CAPS Take by mouth      Flaxseed, Linseed, (FLAXSEED OIL) 1000 MG CAPS Take by mouth      oxybutynin (DITROPAN) 5 MG tablet Take 5 mg by mouth 3 times daily      olmesartan (BENICAR) 20 MG tablet Take 20 mg by mouth daily.  atorvastatin (LIPITOR) 20 MG tablet Take 20 mg by mouth daily. No current facility-administered medications for this visit. Demerol; Medrol [methylprednisolone]; Other; Bactrim [sulfamethoxazole-trimethoprim]; Ciprofloxacin; Oxycodone-acetaminophen; and Sulfa antibiotics  All reviewed and verified by Dr Nita Cm on today's visit    No results found for: PSA, PSADIA  No results found for this visit on 12/07/20. Physical Exam  Vitals:    12/07/20 0841   BP: (!) 142/100   Pulse: 67   Weight: 133 lb (60.3 kg)   Height: 5' 3\" (1.6 m)     Constitutional: Not in distress somewhat anxious. Head and Neck: No lymphadenopathy  Cardiovascular: Normal rate, BP reviewed. Normal  Pulmonary/Chest: Normal respiratory effort no wheezing  Abdominal: Not distended. Urologic Exam  CT reviewed. Urinalysis not available. Vaginal exam deferred until time of cystoscopy. Musculoskeletal: Ambulatory. Extremities: No edema  Neurological: Intact  Skin: No rashes   Psychiatric: Somewhat anxious. Assessment/Medical Necessity-Decision Making  History of right flank pain mild right hydronephrosis on CT questionable passage of recent stone abnormal findings on CT and consistent with a bladder mass  My review of CT shows a questionable area such as this but my suspicion for bladder mass is low  Patient may have passed a stone  Plan  Cystoscopy in the office to further characterize abnormal findings on CT  Greater than 50% of 35 minutes spent consulting patient face-to-face  No orders of the defined types were placed in this encounter. No orders of the defined types were placed in this encounter. Sunil Park MD       Please note this report has been partially produced using speech recognition software  And may cause contain errors related to that system including grammar, punctuation and spelling as well as words and phrases that may seem inappropriate.  If there are questions or concerns please feel free to contact me to clarify.

## 2020-12-11 ENCOUNTER — PROCEDURE VISIT (OUTPATIENT)
Dept: UROLOGY | Age: 70
End: 2020-12-11
Payer: MEDICARE

## 2020-12-11 VITALS
DIASTOLIC BLOOD PRESSURE: 100 MMHG | BODY MASS INDEX: 23.04 KG/M2 | SYSTOLIC BLOOD PRESSURE: 160 MMHG | HEART RATE: 67 BPM | HEIGHT: 63 IN | WEIGHT: 130 LBS

## 2020-12-11 LAB
BILIRUBIN, POC: ABNORMAL
BLOOD URINE, POC: ABNORMAL
CLARITY, POC: CLEAR
COLOR, POC: YELLOW
GLUCOSE URINE, POC: ABNORMAL
KETONES, POC: ABNORMAL
LEUKOCYTE EST, POC: ABNORMAL
NITRITE, POC: ABNORMAL
PH, POC: 7.5
PROTEIN, POC: ABNORMAL
SPECIFIC GRAVITY, POC: 1.01
UROBILINOGEN, POC: 0.2

## 2020-12-11 PROCEDURE — 99999 PR OFFICE/OUTPT VISIT,PROCEDURE ONLY: CPT | Performed by: UROLOGY

## 2020-12-11 PROCEDURE — 81003 URINALYSIS AUTO W/O SCOPE: CPT | Performed by: UROLOGY

## 2020-12-11 RX ORDER — CEPHALEXIN 500 MG/1
500 CAPSULE ORAL ONCE
Qty: 1 CAPSULE | Refills: 0 | COMMUNITY
Start: 2020-12-11 | End: 2020-12-11

## 2020-12-11 NOTE — PROGRESS NOTES
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

## 2020-12-15 ENCOUNTER — ANESTHESIA EVENT (OUTPATIENT)
Dept: OPERATING ROOM | Age: 70
End: 2020-12-15
Payer: MEDICARE

## 2020-12-16 ENCOUNTER — APPOINTMENT (OUTPATIENT)
Dept: GENERAL RADIOLOGY | Age: 70
End: 2020-12-16
Attending: UROLOGY
Payer: MEDICARE

## 2020-12-16 ENCOUNTER — ANESTHESIA (OUTPATIENT)
Dept: OPERATING ROOM | Age: 70
End: 2020-12-16
Payer: MEDICARE

## 2020-12-16 ENCOUNTER — HOSPITAL ENCOUNTER (OUTPATIENT)
Age: 70
Setting detail: OUTPATIENT SURGERY
Discharge: HOME | End: 2020-12-16
Attending: UROLOGY | Admitting: UROLOGY
Payer: MEDICARE

## 2020-12-16 VITALS
HEART RATE: 88 BPM | BODY MASS INDEX: 23.04 KG/M2 | HEIGHT: 63 IN | OXYGEN SATURATION: 99 % | WEIGHT: 130 LBS | SYSTOLIC BLOOD PRESSURE: 139 MMHG | RESPIRATION RATE: 15 BRPM | TEMPERATURE: 98.7 F | DIASTOLIC BLOOD PRESSURE: 70 MMHG

## 2020-12-16 VITALS — DIASTOLIC BLOOD PRESSURE: 51 MMHG | OXYGEN SATURATION: 100 % | SYSTOLIC BLOOD PRESSURE: 78 MMHG | TEMPERATURE: 95.4 F

## 2020-12-16 LAB
EKG ATRIAL RATE: 60 BPM
EKG P AXIS: 45 DEGREES
EKG P-R INTERVAL: 138 MS
EKG Q-T INTERVAL: 416 MS
EKG QRS DURATION: 80 MS
EKG QTC CALCULATION (BAZETT): 416 MS
EKG R AXIS: -9 DEGREES
EKG T AXIS: 13 DEGREES
EKG VENTRICULAR RATE: 60 BPM
SARS-COV-2, NAAT: NOT DETECTED

## 2020-12-16 PROCEDURE — 2709999900 HC NON-CHARGEABLE SUPPLY: Performed by: UROLOGY

## 2020-12-16 PROCEDURE — C2625 STENT, NON-COR, TEM W/DEL SY: HCPCS | Performed by: UROLOGY

## 2020-12-16 PROCEDURE — 88305 TISSUE EXAM BY PATHOLOGIST: CPT

## 2020-12-16 PROCEDURE — 7100000001 HC PACU RECOVERY - ADDTL 15 MIN: Performed by: UROLOGY

## 2020-12-16 PROCEDURE — 2500000003 HC RX 250 WO HCPCS: Performed by: ANESTHESIOLOGY

## 2020-12-16 PROCEDURE — 7100000011 HC PHASE II RECOVERY - ADDTL 15 MIN: Performed by: UROLOGY

## 2020-12-16 PROCEDURE — 3209999900 FLUORO FOR SURGICAL PROCEDURES

## 2020-12-16 PROCEDURE — 6370000000 HC RX 637 (ALT 250 FOR IP): Performed by: UROLOGY

## 2020-12-16 PROCEDURE — 93005 ELECTROCARDIOGRAM TRACING: CPT | Performed by: ANESTHESIOLOGY

## 2020-12-16 PROCEDURE — 6360000002 HC RX W HCPCS: Performed by: UROLOGY

## 2020-12-16 PROCEDURE — 3600000004 HC SURGERY LEVEL 4 BASE: Performed by: UROLOGY

## 2020-12-16 PROCEDURE — 6360000002 HC RX W HCPCS: Performed by: ANESTHESIOLOGY

## 2020-12-16 PROCEDURE — C1769 GUIDE WIRE: HCPCS | Performed by: UROLOGY

## 2020-12-16 PROCEDURE — 6360000004 HC RX CONTRAST MEDICATION: Performed by: UROLOGY

## 2020-12-16 PROCEDURE — 52235 CYSTOSCOPY AND TREATMENT: CPT | Performed by: UROLOGY

## 2020-12-16 PROCEDURE — 93010 ELECTROCARDIOGRAM REPORT: CPT | Performed by: INTERNAL MEDICINE

## 2020-12-16 PROCEDURE — 7100000010 HC PHASE II RECOVERY - FIRST 15 MIN: Performed by: UROLOGY

## 2020-12-16 PROCEDURE — 3700000001 HC ADD 15 MINUTES (ANESTHESIA): Performed by: UROLOGY

## 2020-12-16 PROCEDURE — 3700000000 HC ANESTHESIA ATTENDED CARE: Performed by: UROLOGY

## 2020-12-16 PROCEDURE — C1758 CATHETER, URETERAL: HCPCS | Performed by: UROLOGY

## 2020-12-16 PROCEDURE — U0002 COVID-19 LAB TEST NON-CDC: HCPCS

## 2020-12-16 PROCEDURE — 2580000003 HC RX 258: Performed by: UROLOGY

## 2020-12-16 PROCEDURE — 3600000014 HC SURGERY LEVEL 4 ADDTL 15MIN: Performed by: UROLOGY

## 2020-12-16 PROCEDURE — 7100000000 HC PACU RECOVERY - FIRST 15 MIN: Performed by: UROLOGY

## 2020-12-16 DEVICE — STENT URET 47FR L26CM DBL PIGTAILS LUBRICIOUS COAT TAPR TIP: Type: IMPLANTABLE DEVICE | Site: URETER | Status: FUNCTIONAL

## 2020-12-16 RX ORDER — ONDANSETRON 2 MG/ML
4 INJECTION INTRAMUSCULAR; INTRAVENOUS
Status: DISCONTINUED | OUTPATIENT
Start: 2020-12-16 | End: 2020-12-16 | Stop reason: HOSPADM

## 2020-12-16 RX ORDER — LIDOCAINE HYDROCHLORIDE 20 MG/ML
JELLY TOPICAL PRN
Status: DISCONTINUED | OUTPATIENT
Start: 2020-12-16 | End: 2020-12-16 | Stop reason: ALTCHOICE

## 2020-12-16 RX ORDER — GENTAMICIN SULFATE 80 MG/100ML
80 INJECTION, SOLUTION INTRAVENOUS
Status: COMPLETED | OUTPATIENT
Start: 2020-12-16 | End: 2020-12-16

## 2020-12-16 RX ORDER — LIDOCAINE HYDROCHLORIDE 10 MG/ML
1 INJECTION, SOLUTION EPIDURAL; INFILTRATION; INTRACAUDAL; PERINEURAL
Status: DISCONTINUED | OUTPATIENT
Start: 2020-12-16 | End: 2020-12-16 | Stop reason: HOSPADM

## 2020-12-16 RX ORDER — SODIUM CHLORIDE, SODIUM LACTATE, POTASSIUM CHLORIDE, CALCIUM CHLORIDE 600; 310; 30; 20 MG/100ML; MG/100ML; MG/100ML; MG/100ML
INJECTION, SOLUTION INTRAVENOUS CONTINUOUS
Status: DISCONTINUED | OUTPATIENT
Start: 2020-12-16 | End: 2020-12-16 | Stop reason: HOSPADM

## 2020-12-16 RX ORDER — MIDAZOLAM HYDROCHLORIDE 1 MG/ML
INJECTION INTRAMUSCULAR; INTRAVENOUS PRN
Status: DISCONTINUED | OUTPATIENT
Start: 2020-12-16 | End: 2020-12-16 | Stop reason: SDUPTHER

## 2020-12-16 RX ORDER — ONDANSETRON 2 MG/ML
INJECTION INTRAMUSCULAR; INTRAVENOUS PRN
Status: DISCONTINUED | OUTPATIENT
Start: 2020-12-16 | End: 2020-12-16 | Stop reason: SDUPTHER

## 2020-12-16 RX ORDER — CEPHALEXIN 500 MG/1
500 CAPSULE ORAL EVERY 12 HOURS
Qty: 20 CAPSULE | Refills: 0 | Status: SHIPPED | OUTPATIENT
Start: 2020-12-16 | End: 2021-01-06

## 2020-12-16 RX ORDER — FENTANYL CITRATE 50 UG/ML
50 INJECTION, SOLUTION INTRAMUSCULAR; INTRAVENOUS EVERY 10 MIN PRN
Status: DISCONTINUED | OUTPATIENT
Start: 2020-12-16 | End: 2020-12-16 | Stop reason: HOSPADM

## 2020-12-16 RX ORDER — LIDOCAINE HYDROCHLORIDE 20 MG/ML
INJECTION, SOLUTION EPIDURAL; INFILTRATION; INTRACAUDAL; PERINEURAL PRN
Status: DISCONTINUED | OUTPATIENT
Start: 2020-12-16 | End: 2020-12-16 | Stop reason: SDUPTHER

## 2020-12-16 RX ORDER — DIPHENHYDRAMINE HYDROCHLORIDE 50 MG/ML
12.5 INJECTION INTRAMUSCULAR; INTRAVENOUS
Status: DISCONTINUED | OUTPATIENT
Start: 2020-12-16 | End: 2020-12-16 | Stop reason: HOSPADM

## 2020-12-16 RX ORDER — MAGNESIUM HYDROXIDE 1200 MG/15ML
LIQUID ORAL PRN
Status: DISCONTINUED | OUTPATIENT
Start: 2020-12-16 | End: 2020-12-16 | Stop reason: ALTCHOICE

## 2020-12-16 RX ORDER — SODIUM CHLORIDE 0.9 % (FLUSH) 0.9 %
10 SYRINGE (ML) INJECTION EVERY 12 HOURS SCHEDULED
Status: DISCONTINUED | OUTPATIENT
Start: 2020-12-16 | End: 2020-12-16 | Stop reason: HOSPADM

## 2020-12-16 RX ORDER — METOCLOPRAMIDE HYDROCHLORIDE 5 MG/ML
10 INJECTION INTRAMUSCULAR; INTRAVENOUS
Status: DISCONTINUED | OUTPATIENT
Start: 2020-12-16 | End: 2020-12-16 | Stop reason: HOSPADM

## 2020-12-16 RX ORDER — PROPOFOL 10 MG/ML
INJECTION, EMULSION INTRAVENOUS PRN
Status: DISCONTINUED | OUTPATIENT
Start: 2020-12-16 | End: 2020-12-16 | Stop reason: SDUPTHER

## 2020-12-16 RX ORDER — SODIUM CHLORIDE 0.9 % (FLUSH) 0.9 %
10 SYRINGE (ML) INJECTION PRN
Status: DISCONTINUED | OUTPATIENT
Start: 2020-12-16 | End: 2020-12-16 | Stop reason: HOSPADM

## 2020-12-16 RX ORDER — KETOROLAC TROMETHAMINE 30 MG/ML
INJECTION, SOLUTION INTRAMUSCULAR; INTRAVENOUS PRN
Status: DISCONTINUED | OUTPATIENT
Start: 2020-12-16 | End: 2020-12-16 | Stop reason: SDUPTHER

## 2020-12-16 RX ADMIN — KETOROLAC TROMETHAMINE 30 MG: 30 INJECTION, SOLUTION INTRAMUSCULAR; INTRAVENOUS at 07:44

## 2020-12-16 RX ADMIN — GENTAMICIN SULFATE 80 MG: 80 INJECTION, SOLUTION INTRAVENOUS at 07:38

## 2020-12-16 RX ADMIN — Medication 0.1 MG: at 07:52

## 2020-12-16 RX ADMIN — ONDANSETRON 4 MG: 2 INJECTION INTRAMUSCULAR; INTRAVENOUS at 08:00

## 2020-12-16 RX ADMIN — PROPOFOL 150 MG: 10 INJECTION, EMULSION INTRAVENOUS at 07:32

## 2020-12-16 RX ADMIN — LIDOCAINE HYDROCHLORIDE 40 ML: 20 INJECTION, SOLUTION EPIDURAL; INFILTRATION; INTRACAUDAL; PERINEURAL at 07:32

## 2020-12-16 RX ADMIN — Medication 0.1 MG: at 07:56

## 2020-12-16 RX ADMIN — MIDAZOLAM HYDROCHLORIDE 2 MG: 2 INJECTION, SOLUTION INTRAMUSCULAR; INTRAVENOUS at 07:25

## 2020-12-16 RX ADMIN — SODIUM CHLORIDE, POTASSIUM CHLORIDE, SODIUM LACTATE AND CALCIUM CHLORIDE: 600; 310; 30; 20 INJECTION, SOLUTION INTRAVENOUS at 06:51

## 2020-12-16 ASSESSMENT — PULMONARY FUNCTION TESTS
PIF_VALUE: 1
PIF_VALUE: 24
PIF_VALUE: 2
PIF_VALUE: 12
PIF_VALUE: 12
PIF_VALUE: 10
PIF_VALUE: 12
PIF_VALUE: 2
PIF_VALUE: 12
PIF_VALUE: 10
PIF_VALUE: 12
PIF_VALUE: 18
PIF_VALUE: 12
PIF_VALUE: 2
PIF_VALUE: 2
PIF_VALUE: 12
PIF_VALUE: 12
PIF_VALUE: 10
PIF_VALUE: 12
PIF_VALUE: 12
PIF_VALUE: 10
PIF_VALUE: 24
PIF_VALUE: 1
PIF_VALUE: 12
PIF_VALUE: 2
PIF_VALUE: 1
PIF_VALUE: 3
PIF_VALUE: 12
PIF_VALUE: 2
PIF_VALUE: 12
PIF_VALUE: 2
PIF_VALUE: 10
PIF_VALUE: 12
PIF_VALUE: 12
PIF_VALUE: 3
PIF_VALUE: 3
PIF_VALUE: 2
PIF_VALUE: 12
PIF_VALUE: 13
PIF_VALUE: 12
PIF_VALUE: 10
PIF_VALUE: 12
PIF_VALUE: 3
PIF_VALUE: 3
PIF_VALUE: 12
PIF_VALUE: 2
PIF_VALUE: 2
PIF_VALUE: 13
PIF_VALUE: 12

## 2020-12-16 ASSESSMENT — PAIN SCALES - GENERAL: PAINLEVEL_OUTOF10: 5

## 2020-12-16 NOTE — ANESTHESIA POSTPROCEDURE EVALUATION
Department of Anesthesiology  Postprocedure Note    Patient: Wilda Stubbs  MRN: [id number]  YOB: 1950  Date of evaluation: 12/16/2020  Time:  8:30 AM     Procedure Summary     Date: 12/16/20 Room / Location: [facility]    Anesthesia Start: 3270 Anesthesia Stop: Arina Susulamont    Procedures:       CYSTOSCOPY RIGHT RETROGRADE PYELOGRAM (Right )      POSSIBLE BIOPSY WITH FULGURATION (N/A ) Diagnosis: (HEMATURIA)    Surgeons: Félix Meek MD Responsible Provider: Henrik Roth MD    Anesthesia Type: general ASA Status: 2          Anesthesia Type: general    Vero Phase I:      Vero Phase II:      Last vitals: Reviewed and per EMR flowsheets.        Anesthesia Post Evaluation    Patient location during evaluation: PACU  Patient participation: complete - patient participated  Level of consciousness: awake and alert  Airway patency: patent  Nausea & Vomiting: no nausea and no vomiting  Complications: no  Cardiovascular status: blood pressure returned to baseline and hemodynamically stable  Respiratory status: acceptable  Hydration status: euvolemic

## 2020-12-16 NOTE — ANESTHESIA PRE PROCEDURE
Department of Anesthesiology  Preprocedure Note       Name:  Wilda Stubbs   Age:  79 y.o.  :  1950                                          MRN:  [id number]         Date:  2020      Surgeon: Blossom Diaz):  Félix Meek MD    Procedure: Procedure(s):  CYSTOSCOPY RIGHT RETROGRADE PYELOGRAM  POSSIBLE BIOPSY WITH FULGURATION. (RECENT LABS IN ER) RAPID COVID ON ADMIT    Medications prior to admission:   Prior to Admission medications    Medication Sig Start Date End Date Taking? Authorizing Provider   Omega-3 Fatty Acids (FISH OIL) 1000 MG CAPS Take 3,000 mg by mouth 3 times daily    Historical Provider, MD   vitamin C (ASCORBIC ACID) 500 MG tablet Take 500 mg by mouth daily    Historical Provider, MD   Vaginal Lubricant (REPLENS [place]) Place vaginally    Historical Provider, MD   ondansetron (ZOFRAN ODT) 4 MG disintegrating tablet Take 1 tablet by mouth every 8 hours as needed for Nausea 12/3/20   Brandi Barber PA-C   Multiple Vitamins-Minerals (CENTRUM SILVER) TABS Take by mouth 06   Historical Provider, MD   Vitamin D, Cholecalciferol, 1000 UNITS CAPS Take by mouth 1/10/08   Historical Provider, MD   Flaxseed, Linseed, (FLAXSEED OIL) 1000 MG CAPS Take by mouth 1/10/08   Historical Provider, MD   oxybutynin (DITROPAN) 5 MG tablet Take 5 mg by mouth 3 times daily    Historical Provider, MD   olmesartan (BENICAR) 20 MG tablet Take 20 mg by mouth daily. Historical Provider, MD   atorvastatin (LIPITOR) 20 MG tablet Take 20 mg by mouth daily. Historical Provider, MD       Current medications:    Current Facility-Administered Medications   Medication Dose Route Frequency Provider Last Rate Last Admin    gentamicin (GARAMYCIN) IVPB 80 mg  80 mg Intravenous On Call to Nicole Cornejo 36MD Geronimo        lactated ringers infusion   Intravenous Continuous Félix Meek MD           Allergies:     Allergies   Allergen Reactions    Demerol Other (See Comments)     Vomiting 12/07/20 133 lb (60.3 kg)   12/03/20 143 lb (64.9 kg)     There is no height or weight on file to calculate BMI.    CBC:   Lab Results   Component Value Date    WBC 6.9 12/03/2020    RBC 4.14 12/03/2020    HGB 13.2 12/03/2020    HCT 37.7 12/03/2020    MCV 91.1 12/03/2020    RDW 13.7 12/03/2020     12/03/2020       CMP:   Lab Results   Component Value Date     12/03/2020    K 3.4 12/03/2020     12/03/2020    CO2 21 12/03/2020    BUN 13 12/03/2020    CREATININE 0.9 12/03/2020    CREATININE 0.87 12/03/2020    GFRAA >60 12/03/2020    LABGLOM >60 12/03/2020    GLUCOSE 119 12/03/2020    PROT 7.1 12/03/2020    CALCIUM 9.2 12/03/2020    BILITOT 0.3 12/03/2020    ALKPHOS 98 12/03/2020    AST 23 12/03/2020    ALT 14 12/03/2020       POC Tests: No results for input(s): POCGLU, POCNA, POCK, POCCL, POCBUN, POCHEMO, POCHCT in the last 72 hours. Coags: No results found for: PROTIME, INR, APTT    HCG (If Applicable): No results found for: PREGTESTUR, PREGSERUM, HCG, HCGQUANT     ABGs: No results found for: PHART, PO2ART, QZH3XRL, UPF0UKM, BEART, J4NIABRQ     Type & Screen (If Applicable):  No results found for: LABABO, LABRH    Drug/Infectious Status (If Applicable):  No results found for: HIV, HEPCAB    COVID-19 Screening (If Applicable): No results found for: COVID19      Anesthesia Evaluation  Patient summary reviewed and Nursing notes reviewed no history of anesthetic complications:   Airway: Mallampati: II  TM distance: >3 FB   Neck ROM: full  Mouth opening: > = 3 FB Dental: normal exam         Pulmonary:                             ROS comment: Reformed smoker   Cardiovascular:    (+) hypertension:, hyperlipidemia      ECG reviewed               Beta Blocker:  Not on Beta Blocker         Neuro/Psych:   Negative Neuro/Psych ROS              GI/Hepatic/Renal:   (+) GERD:, renal disease (hematuria):,           Endo/Other:                     Abdominal:           Vascular: negative vascular ROS. Anesthesia Plan      general     ASA 2       Induction: intravenous. MIPS: Postoperative opioids intended and Prophylactic antiemetics administered. Anesthetic plan and risks discussed with patient.         Attending anesthesiologist reviewed and agrees with Angela Colon MD   12/16/2020

## 2020-12-16 NOTE — OP NOTE
Operative Note      Patient: Rubens Kirkpatrick  YOB: 1950  MRN: [id number]    Date of Procedure: 12/16/2020    Pre-Op Diagnosis: HEMATURIA    Post-Op Diagnosis: Neoplasm involving the right ureteral orifice       Procedure(s):  CYSTOSCOPY RIGHT RETROGRADE PYELOGRAM  POSSIBLE BIOPSY WITH FULGURATION  Right double-J stent placement  Bladder biopsies  TURBT neoplasm involving right ureter  Surgeon(s):  Adry Sagastume MD    Assistant:   * No surgical staff found *    Anesthesia: General    Estimated Blood Loss (mL): Minimal    Complications: None    Specimens:   ID Type Source Tests Collected by Time Destination   A : BLADDER BX Tissue Bladder SURGICAL PATHOLOGY Adry Sagastume MD 12/16/2020 6108        Implants:  Implant Name Type Inv. Item Serial No.  Lot No. LRB No. Used Action   STENT URET 47FR L26CM DBL PIGTAILS LUBRICIOUS COAT TAPR TIP  STENT URET 47FR L26CM DBL PIGTAILS LUBRICIOUS COAT TAPR TIP  [employer] [id number] Right 1 Implanted         Drains: * No LDAs found *    Findings: Neoplasm involving right ureter with distorted right ureteral orifice    Detailed Description of Procedure: Indications  75-year-old female former smoker with right flank pain it was evaluated in the emergency room with a CT scan which showed a filling defect consistent with a bladder mass. There was mild right hydronephrosis.   Patient has no history of gynecologic surgery  Patient has vaginal atrophy  Attempts at completing her hematuria work-up with an office cystoscopy failed due to vaginal stenosis and discomfort  Patient is being taken to the operating room for cystoscopy, possible ureteral biopsy, possible right retrograde pyelogram, possible right double-J stent placement, with possibility of patient need to go home with a indwelling catheter  Patient understands risks benefits wants to proceed    Operative note Patient was brought to the operating room placed operative table in the dorsolithotomy position after initiation of general anesthetic. Patient received preoperative IV antibiotics. Timeout was taken. 21 Uzbek cystoscope was placed with an obturator through the urethra without any difficulty. Initial examination bladder showed distorted right ureteral orifice with no identifiable ureteral orifice  The right trigone was distorted due to a neoplasm  This was nonpapillary nature  Very friable with cystitis cystica glandularis in and around the ureteral orifice location  After multiple attempts the ureter was finally identified by placing a catheter into the mass and a right double-J stent was placed under fluoroscopy  Retrograde pyelogram done was of poor quality due to the stent being in place however there was no distinct filling defect within the right ureter  With the ureteral orifice being protected by did indwelling double-J stent multiple cold cup biopsies were done in a circumferential fashion around the ureteral orifice  Area was then fulgurated with careful attention not to injure the ureter  Rest of the bladder showed no evidence of tumors, stones, and/or other abnormalities  Patient was discharged to recovery in stable condition. Patient tolerated procedure well there were no complications.       Electronically signed by Bacilio Lorenz MD on 12/16/2020 at 8:23 AM

## 2020-12-16 NOTE — PROGRESS NOTES
D/c instructions given to pt. And  at the car-with understanding. no rx's given pt. Had pain med. At home from er visit.

## 2020-12-17 ENCOUNTER — TELEPHONE (OUTPATIENT)
Dept: UROLOGY | Age: 70
End: 2020-12-17

## 2020-12-22 ENCOUNTER — VIRTUAL VISIT (OUTPATIENT)
Dept: UROLOGY | Age: 70
End: 2020-12-22

## 2020-12-22 PROCEDURE — 99024 POSTOP FOLLOW-UP VISIT: CPT | Performed by: UROLOGY

## 2020-12-22 NOTE — PROGRESS NOTES
Urology  Status post bladder biopsies TURBT  Patient at home Dr Marshall Forward the office virtual visit to review pathology    FINAL DIAGNOSIS:   BLADDER BIOPSY:   PAPILLARY UROTHELIAL CARCINOMA     PROCEDURE: BIOPSY   TUMOR SITE: NOT SPECIFIED   HISTOLOGIC TYPE: PAPILLARY UROTHELIAL CARCINOMA, INVASIVE   ASSOCIATED EPITHELIAL LESIONS: NONE IDENTIFIED   HISTOLOGIC GRADE: LOW GRADE   TUMOR CONFIGURATION: PAPILLARY   MUSCULARIS PROPRIA PRESENCE: NO MUSCULARIS PROPRIA IDENTIFIED   LYMPHOVASCULAR INVASION: NOT IDENTIFIED   TUMOR EXTENSION: TUMOR INVADES LAMINA PROPRIA      Patient was already aware of the pathology on my chart  She has an indwelling right double-J stent  She will be scheduled for removal of stent, right ureteroscopy, bladder biopsies with fulguration on 1/13/2021  Preoperative instructions given  Greater than 5 less than 10-minute virtual visit over telephone  Susen Oppenheim MD

## 2020-12-29 ENCOUNTER — TELEPHONE (OUTPATIENT)
Dept: UROLOGY | Age: 70
End: 2020-12-29

## 2020-12-29 NOTE — TELEPHONE ENCOUNTER
Can patient start taking her Replens? She is having hematuria that she usually gets if she doesn't take the Replens.   She is scheduled for a procedure 1/13/21

## 2021-01-06 ENCOUNTER — HOSPITAL ENCOUNTER (OUTPATIENT)
Dept: PREADMISSION TESTING | Age: 71
Discharge: HOME | End: 2021-01-10
Payer: MEDICARE

## 2021-01-06 ENCOUNTER — NURSE ONLY (OUTPATIENT)
Dept: PRIMARY CARE CLINIC | Age: 71
End: 2021-01-06

## 2021-01-06 VITALS
HEIGHT: 63 IN | DIASTOLIC BLOOD PRESSURE: 78 MMHG | WEIGHT: 128.2 LBS | OXYGEN SATURATION: 98 % | TEMPERATURE: 97.9 F | RESPIRATION RATE: 16 BRPM | SYSTOLIC BLOOD PRESSURE: 152 MMHG | HEART RATE: 59 BPM | BODY MASS INDEX: 22.71 KG/M2

## 2021-01-06 DIAGNOSIS — R93.89 ABNORMAL FINDING ON CT SCAN: ICD-10-CM

## 2021-01-06 PROBLEM — C67.9 BLADDER CANCER (HCC): Status: ACTIVE | Noted: 2021-01-06

## 2021-01-06 PROBLEM — E04.2 MULTIPLE THYROID NODULES: Status: ACTIVE | Noted: 2018-02-09

## 2021-01-06 PROBLEM — N95.2 VAGINAL ATROPHY: Status: ACTIVE | Noted: 2019-11-19

## 2021-01-06 LAB
ANION GAP SERPL CALCULATED.3IONS-SCNC: 11 MEQ/L (ref 9–15)
BACTERIA: NEGATIVE /HPF
BILIRUBIN URINE: NEGATIVE
BLOOD, URINE: ABNORMAL
BUN BLDV-MCNC: 11 MG/DL (ref 8–23)
CALCIUM SERPL-MCNC: 9.3 MG/DL (ref 8.5–9.9)
CHLORIDE BLD-SCNC: 108 MEQ/L (ref 95–107)
CLARITY: CLEAR
CO2: 25 MEQ/L (ref 20–31)
COLOR: YELLOW
CREAT SERPL-MCNC: 0.89 MG/DL (ref 0.5–0.9)
EPITHELIAL CELLS, UA: ABNORMAL /HPF (ref 0–5)
GFR AFRICAN AMERICAN: >60
GFR NON-AFRICAN AMERICAN: >60
GLUCOSE BLD-MCNC: 93 MG/DL (ref 70–99)
GLUCOSE URINE: NEGATIVE MG/DL
HCT VFR BLD CALC: 39.7 % (ref 37–47)
HEMOGLOBIN: 13.1 G/DL (ref 12–16)
HYALINE CASTS: ABNORMAL /HPF (ref 0–5)
KETONES, URINE: NEGATIVE MG/DL
LEUKOCYTE ESTERASE, URINE: ABNORMAL
MCH RBC QN AUTO: 30.2 PG (ref 27–31.3)
MCHC RBC AUTO-ENTMCNC: 33 % (ref 33–37)
MCV RBC AUTO: 91.6 FL (ref 82–100)
NITRITE, URINE: NEGATIVE
PDW BLD-RTO: 13.6 % (ref 11.5–14.5)
PH UA: 7.5 (ref 5–9)
PLATELET # BLD: 262 K/UL (ref 130–400)
POTASSIUM SERPL-SCNC: 4.6 MEQ/L (ref 3.4–4.9)
PROTEIN UA: NEGATIVE MG/DL
RBC # BLD: 4.34 M/UL (ref 4.2–5.4)
RBC UA: ABNORMAL /HPF (ref 0–5)
SODIUM BLD-SCNC: 144 MEQ/L (ref 135–144)
SPECIFIC GRAVITY UA: 1.01 (ref 1–1.03)
URINE REFLEX TO CULTURE: YES
UROBILINOGEN, URINE: 0.2 E.U./DL
WBC # BLD: 4.6 K/UL (ref 4.8–10.8)
WBC UA: ABNORMAL /HPF (ref 0–5)

## 2021-01-06 PROCEDURE — 80048 BASIC METABOLIC PNL TOTAL CA: CPT

## 2021-01-06 PROCEDURE — 87086 URINE CULTURE/COLONY COUNT: CPT

## 2021-01-06 PROCEDURE — 85027 COMPLETE CBC AUTOMATED: CPT

## 2021-01-06 PROCEDURE — 81001 URINALYSIS AUTO W/SCOPE: CPT

## 2021-01-06 RX ORDER — HYDROCODONE BITARTRATE AND ACETAMINOPHEN 5; 325 MG/1; MG/1
1 TABLET ORAL EVERY 6 HOURS PRN
COMMUNITY

## 2021-01-06 RX ORDER — SODIUM CHLORIDE 0.9 % (FLUSH) 0.9 %
10 SYRINGE (ML) INJECTION EVERY 12 HOURS SCHEDULED
Status: CANCELLED | OUTPATIENT
Start: 2021-01-13

## 2021-01-06 RX ORDER — SODIUM CHLORIDE 0.9 % (FLUSH) 0.9 %
10 SYRINGE (ML) INJECTION PRN
Status: CANCELLED | OUTPATIENT
Start: 2021-01-13

## 2021-01-06 RX ORDER — SODIUM CHLORIDE, SODIUM LACTATE, POTASSIUM CHLORIDE, CALCIUM CHLORIDE 600; 310; 30; 20 MG/100ML; MG/100ML; MG/100ML; MG/100ML
INJECTION, SOLUTION INTRAVENOUS CONTINUOUS
Status: CANCELLED | OUTPATIENT
Start: 2021-01-13

## 2021-01-06 RX ORDER — LIDOCAINE HYDROCHLORIDE 10 MG/ML
1 INJECTION, SOLUTION EPIDURAL; INFILTRATION; INTRACAUDAL; PERINEURAL
Status: CANCELLED | OUTPATIENT
Start: 2021-01-13 | End: 2021-01-13

## 2021-01-06 ASSESSMENT — ENCOUNTER SYMPTOMS
ABDOMINAL PAIN: 0
SORE THROAT: 0
WHEEZING: 0
COUGH: 0
NAUSEA: 0
DIARRHEA: 0
CONSTIPATION: 0
BACK PAIN: 0
VOMITING: 0
EYES NEGATIVE: 1
CHEST TIGHTNESS: 0
ALLERGIC/IMMUNOLOGIC NEGATIVE: 1
SHORTNESS OF BREATH: 0
STRIDOR: 0

## 2021-01-06 NOTE — H&P
Nurse Practitioner History and Physical      CHIEF COMPLAINT:  Bladder surgery    HISTORY OF PRESENT ILLNESS:      The patient is a 79 y.o. female with significant past medical history of bladder cancer who presents for bladder biopsy, right retrograde pyelogram, right ureteroscopy, right stent removal. States history of first time right kidney stone 12/3/2020 with ED visit -- CT scan done & bladder mass identified. 12/16/2020 cystoscopy, right retrograde pyelogram & right stent placement. Denies any  sx.  Scheduled for OR. Past Medical History:        Diagnosis Date    Arthritis     Cancer ([address]     bladder    History of blood transfusion     as infant    Hyperlipidemia     meds > 10 yrs    Hypertension     meds > 10 yrs / denies TIA or stroke    Thyroid disease     past biopsy due to nodules     Past Surgical History:    Past Surgical History:   Procedure Laterality Date    BREAST SURGERY  2008    biopsy right breast    COLONOSCOPY      2 previous colonscopy polyps    COLONOSCOPY  04/12/2017    DR Underwood    CYSTOSCOPY Right 12/16/2020    CYSTOSCOPY RIGHT RETROGRADE PYELOGRAM performed by Jane Chu MD at [facility] N/A 12/16/2020    POSSIBLE BIOPSY WITH FULGURATION performed by Jane Chu MD at [facility]  2017    endometriosis / several D & C    [place] COLON CA SCRN NOT  [address] IND N/A 4/12/2017    COLONOSCOPY, PAT DONE  performed by Prince Thao MD at [facility] Right 2000s    big toe spur    TONSILLECTOMY      as child         Medications Prior to Admission:    Current Outpatient Medications   Medication Sig Dispense Refill    Vaginal Lubricant (REPLENS [facility]) Place vaginally Indications: every 3 days       oxybutynin (DITROPAN) 5 MG tablet Take 5 mg by mouth 3 times daily      olmesartan (BENICAR) 20 MG tablet Take 20 mg by mouth daily.  atorvastatin (LIPITOR) 20 MG tablet Take 20 mg by mouth every evening       ondansetron (ZOFRAN ODT) 4 MG disintegrating tablet Take 1 tablet by mouth every 8 hours as needed for Nausea 20 tablet 0     No current facility-administered medications for this encounter.         Allergies:  Keflex [cephalexin], Demerol, Medrol [methylprednisolone], Other, Bactrim [sulfamethoxazole-trimethoprim], Ciprofloxacin, Oxycodone-acetaminophen, and Sulfa antibiotics    Social History:   Social History     Socioeconomic History    Marital status:      Spouse name: Not on file    Number of children: Not on file    Years of education: Not on file    Highest education level: Not on file   Occupational History    Not on file   Social Needs    Financial resource strain: Not on file    Food insecurity     Worry: Not on file     Inability: Not on file    Transportation needs     Medical: Not on file     Non-medical: Not on file   Tobacco Use    Smoking status: Former Smoker     Packs/day: 0.50     Years: 25.00     Pack years: 12.50     Quit date: 2008     Years since quittin.6    Smokeless tobacco: Never Used   Substance and Sexual Activity    Alcohol use: Yes     Comment: rare    Drug use: No    Sexual activity: Not on file   Lifestyle    Physical activity     Days per week: Not on file     Minutes per session: Not on file    Stress: Not on file   Relationships    Social connections     Talks on phone: Not on file     Gets together: Not on file     Attends [employer] service: Not on file     Active member of club or organization: Not on file     Attends meetings of clubs or organizations: Not on file     Relationship status: Not on file    Intimate partner violence     Fear of current or ex partner: Not on file     Emotionally abused: Not on file     Physically abused: Not on file     Forced sexual activity: Not on file   Other Topics Concern    Not on file   Social History Narrative    Not on file Family History:       Problem Relation Age of Onset    Dementia Mother     High Blood Pressure Mother     High Cholesterol Mother     High Blood Pressure Father     High Cholesterol Father     Emphysema Father     Cancer Father         lung    Kidney Disease Father     Diabetes Father     No Known Problems Sister        Review of Systems   Constitutional: Negative. Negative for chills and fever. HENT: Negative for congestion, sore throat and tinnitus. Eyes: Negative. Negative for visual disturbance. Respiratory: Negative for cough, chest tightness, shortness of breath, wheezing and stridor. Cardiovascular: Negative for chest pain and palpitations. Gastrointestinal: Negative for abdominal pain, constipation, diarrhea, nausea and vomiting. Endocrine: Negative. Genitourinary: Negative for dysuria and frequency. Musculoskeletal: Negative for back pain, myalgias and neck pain. Skin: Negative. Allergic/Immunologic: Negative. Neurological: Negative. Negative for seizures and headaches. Hematological: Negative. Psychiatric/Behavioral: Negative. Vitals:  [facility] 03/31/1998     Physical Exam  Constitutional:       Appearance: She is well-developed. HENT:      Head: Normocephalic and atraumatic. Right Ear: Tympanic membrane, ear canal and external ear normal.      Left Ear: Tympanic membrane, ear canal and external ear normal.      Nose: No congestion. Mouth/Throat:      Mouth: Mucous membranes are moist.   Eyes:      General: No scleral icterus. Extraocular Movements: Extraocular movements intact. Conjunctiva/sclera: Conjunctivae normal.      Pupils: Pupils are equal, round, and reactive to light. Neck:      Musculoskeletal: Normal range of motion. Thyroid: No thyromegaly. Vascular: No JVD. Trachea: No tracheal deviation. Cardiovascular:      Rate and Rhythm: Normal rate and regular rhythm. Heart sounds: Normal heart sounds. No murmur. No gallop. Pulmonary:      Effort: Pulmonary effort is normal. No respiratory distress. Breath sounds: Normal breath sounds. No wheezing or rales. Abdominal:      General: Bowel sounds are normal.      Palpations: Abdomen is soft. Tenderness: There is no abdominal tenderness. Genitourinary:     Comments: Deferred. Musculoskeletal: Normal range of motion. Right lower leg: No edema. Left lower leg: No edema. Lymphadenopathy:      Cervical: No cervical adenopathy. Skin:     General: Skin is warm and dry. Findings: No erythema. Neurological:      Mental Status: She is alert and oriented to person, place, and time. Gait: Gait normal.   Psychiatric:         Mood and Affect: Mood normal.         Behavior: Behavior normal.         Thought Content:  Thought content normal.         Judgment: Judgment normal.         [unfilled]    Assessment:  Patient Active Problem List   Diagnosis    Atypical ductal hyperplasia of breast    Acid reflux    BP (high blood pressure)    Combined fat and carbohydrate induced hyperlipemia    Osteoarthritis of ankle or foot    Osteoporosis    Vitamin D deficiency    Former smoker, stopped smoking in distant past    Neoplasm of bladder    [address] [facility] [address] [address]    Vaginal atrophy    Multiple thyroid nodules    Goiter    Bunion of great toe of right foot         Plan:  Scheduled for bladder biopsy, right retrograde pyelogram, right ureteroscopy, right stent removal.    GABI Powers CNP  1/6/2021  7:31 AM

## 2021-01-06 NOTE — PROGRESS NOTES
Covid test to be done 1/6/2021 & will self quarantine until OR 1/13/2021. EKG dated 12/16/2020 & on [facility].

## 2021-01-08 LAB
SARS-COV-2: NOT DETECTED
SOURCE: NORMAL
URINE CULTURE, ROUTINE: NORMAL

## 2021-01-12 ENCOUNTER — ANESTHESIA EVENT (OUTPATIENT)
Dept: OPERATING ROOM | Age: 71
End: 2021-01-12
Payer: MEDICARE

## 2021-01-13 ENCOUNTER — ANESTHESIA (OUTPATIENT)
Dept: OPERATING ROOM | Age: 71
End: 2021-01-13
Payer: MEDICARE

## 2021-01-13 ENCOUNTER — HOSPITAL ENCOUNTER (OUTPATIENT)
Age: 71
Setting detail: OUTPATIENT SURGERY
Discharge: HOME | End: 2021-01-13
Attending: UROLOGY | Admitting: UROLOGY
Payer: MEDICARE

## 2021-01-13 ENCOUNTER — APPOINTMENT (OUTPATIENT)
Dept: GENERAL RADIOLOGY | Age: 71
End: 2021-01-13
Attending: UROLOGY
Payer: MEDICARE

## 2021-01-13 VITALS
HEART RATE: 80 BPM | BODY MASS INDEX: 22.68 KG/M2 | OXYGEN SATURATION: 98 % | RESPIRATION RATE: 16 BRPM | DIASTOLIC BLOOD PRESSURE: 68 MMHG | SYSTOLIC BLOOD PRESSURE: 123 MMHG | TEMPERATURE: 97.8 F | HEIGHT: 63 IN | WEIGHT: 128 LBS

## 2021-01-13 VITALS — DIASTOLIC BLOOD PRESSURE: 79 MMHG | OXYGEN SATURATION: 96 % | SYSTOLIC BLOOD PRESSURE: 133 MMHG

## 2021-01-13 PROCEDURE — 7100000001 HC PACU RECOVERY - ADDTL 15 MIN: Performed by: UROLOGY

## 2021-01-13 PROCEDURE — 88305 TISSUE EXAM BY PATHOLOGIST: CPT

## 2021-01-13 PROCEDURE — 2720000010 HC SURG SUPPLY STERILE: Performed by: UROLOGY

## 2021-01-13 PROCEDURE — 6360000002 HC RX W HCPCS: Performed by: NURSE ANESTHETIST, CERTIFIED REGISTERED

## 2021-01-13 PROCEDURE — 6360000004 HC RX CONTRAST MEDICATION: Performed by: UROLOGY

## 2021-01-13 PROCEDURE — 6370000000 HC RX 637 (ALT 250 FOR IP): Performed by: UROLOGY

## 2021-01-13 PROCEDURE — 2580000003 HC RX 258: Performed by: UROLOGY

## 2021-01-13 PROCEDURE — 6370000000 HC RX 637 (ALT 250 FOR IP): Performed by: ANESTHESIOLOGY

## 2021-01-13 PROCEDURE — 2709999900 HC NON-CHARGEABLE SUPPLY: Performed by: UROLOGY

## 2021-01-13 PROCEDURE — 2580000003 HC RX 258: Performed by: NURSE PRACTITIONER

## 2021-01-13 PROCEDURE — 3600000004 HC SURGERY LEVEL 4 BASE: Performed by: UROLOGY

## 2021-01-13 PROCEDURE — C1758 CATHETER, URETERAL: HCPCS | Performed by: UROLOGY

## 2021-01-13 PROCEDURE — 3700000000 HC ANESTHESIA ATTENDED CARE: Performed by: UROLOGY

## 2021-01-13 PROCEDURE — 7100000011 HC PHASE II RECOVERY - ADDTL 15 MIN: Performed by: UROLOGY

## 2021-01-13 PROCEDURE — 52240 CYSTOSCOPY AND TREATMENT: CPT | Performed by: UROLOGY

## 2021-01-13 PROCEDURE — 7100000000 HC PACU RECOVERY - FIRST 15 MIN: Performed by: UROLOGY

## 2021-01-13 PROCEDURE — 6360000002 HC RX W HCPCS: Performed by: NURSE PRACTITIONER

## 2021-01-13 PROCEDURE — C1769 GUIDE WIRE: HCPCS | Performed by: UROLOGY

## 2021-01-13 PROCEDURE — 52351 CYSTOURETERO & OR PYELOSCOPE: CPT | Performed by: UROLOGY

## 2021-01-13 PROCEDURE — 76000 FLUOROSCOPY <1 HR PHYS/QHP: CPT

## 2021-01-13 PROCEDURE — 7100000010 HC PHASE II RECOVERY - FIRST 15 MIN: Performed by: UROLOGY

## 2021-01-13 PROCEDURE — 2500000003 HC RX 250 WO HCPCS: Performed by: NURSE ANESTHETIST, CERTIFIED REGISTERED

## 2021-01-13 PROCEDURE — 3600000014 HC SURGERY LEVEL 4 ADDTL 15MIN: Performed by: UROLOGY

## 2021-01-13 PROCEDURE — 3700000001 HC ADD 15 MINUTES (ANESTHESIA): Performed by: UROLOGY

## 2021-01-13 PROCEDURE — 52332 CYSTOSCOPY AND TREATMENT: CPT | Performed by: UROLOGY

## 2021-01-13 PROCEDURE — C2625 STENT, NON-COR, TEM W/DEL SY: HCPCS | Performed by: UROLOGY

## 2021-01-13 PROCEDURE — 6360000002 HC RX W HCPCS: Performed by: ANESTHESIOLOGY

## 2021-01-13 DEVICE — STENT URET 6FR L24CM POLYMER BLEND PH FREE COAT GRADUAL TAPR: Type: IMPLANTABLE DEVICE | Site: URETER | Status: FUNCTIONAL

## 2021-01-13 RX ORDER — SODIUM CHLORIDE, SODIUM LACTATE, POTASSIUM CHLORIDE, CALCIUM CHLORIDE 600; 310; 30; 20 MG/100ML; MG/100ML; MG/100ML; MG/100ML
INJECTION, SOLUTION INTRAVENOUS CONTINUOUS
Status: DISCONTINUED | OUTPATIENT
Start: 2021-01-13 | End: 2021-01-13 | Stop reason: HOSPADM

## 2021-01-13 RX ORDER — LIDOCAINE HYDROCHLORIDE 20 MG/ML
INJECTION, SOLUTION INFILTRATION; PERINEURAL PRN
Status: DISCONTINUED | OUTPATIENT
Start: 2021-01-13 | End: 2021-01-13 | Stop reason: SDUPTHER

## 2021-01-13 RX ORDER — DOXYCYCLINE HYCLATE 100 MG/1
100 CAPSULE ORAL 2 TIMES DAILY
Qty: 20 CAPSULE | Refills: 0 | Status: SHIPPED | OUTPATIENT
Start: 2021-01-13 | End: 2021-01-14 | Stop reason: SINTOL

## 2021-01-13 RX ORDER — ONDANSETRON 2 MG/ML
4 INJECTION INTRAMUSCULAR; INTRAVENOUS
Status: DISCONTINUED | OUTPATIENT
Start: 2021-01-13 | End: 2021-01-13 | Stop reason: HOSPADM

## 2021-01-13 RX ORDER — ONDANSETRON 2 MG/ML
INJECTION INTRAMUSCULAR; INTRAVENOUS PRN
Status: DISCONTINUED | OUTPATIENT
Start: 2021-01-13 | End: 2021-01-13 | Stop reason: SDUPTHER

## 2021-01-13 RX ORDER — FENTANYL CITRATE 50 UG/ML
50 INJECTION, SOLUTION INTRAMUSCULAR; INTRAVENOUS EVERY 10 MIN PRN
Status: DISCONTINUED | OUTPATIENT
Start: 2021-01-13 | End: 2021-01-13 | Stop reason: HOSPADM

## 2021-01-13 RX ORDER — PROPOFOL 10 MG/ML
INJECTION, EMULSION INTRAVENOUS PRN
Status: DISCONTINUED | OUTPATIENT
Start: 2021-01-13 | End: 2021-01-13 | Stop reason: SDUPTHER

## 2021-01-13 RX ORDER — HYDROCODONE BITARTRATE AND ACETAMINOPHEN 5; 325 MG/1; MG/1
2 TABLET ORAL EVERY 4 HOURS PRN
Status: COMPLETED | OUTPATIENT
Start: 2021-01-13 | End: 2021-01-13

## 2021-01-13 RX ORDER — ATROPA BELLADONNA AND OPIUM 16.2; 6 MG/1; MG/1
SUPPOSITORY RECTAL PRN
Status: DISCONTINUED | OUTPATIENT
Start: 2021-01-13 | End: 2021-01-13 | Stop reason: ALTCHOICE

## 2021-01-13 RX ORDER — SODIUM CHLORIDE 0.9 % (FLUSH) 0.9 %
10 SYRINGE (ML) INJECTION PRN
Status: DISCONTINUED | OUTPATIENT
Start: 2021-01-13 | End: 2021-01-13 | Stop reason: HOSPADM

## 2021-01-13 RX ORDER — LIDOCAINE HYDROCHLORIDE 10 MG/ML
1 INJECTION, SOLUTION EPIDURAL; INFILTRATION; INTRACAUDAL; PERINEURAL
Status: DISCONTINUED | OUTPATIENT
Start: 2021-01-13 | End: 2021-01-13 | Stop reason: HOSPADM

## 2021-01-13 RX ORDER — MAGNESIUM HYDROXIDE 1200 MG/15ML
LIQUID ORAL PRN
Status: DISCONTINUED | OUTPATIENT
Start: 2021-01-13 | End: 2021-01-13 | Stop reason: ALTCHOICE

## 2021-01-13 RX ORDER — SODIUM CHLORIDE 0.9 % (FLUSH) 0.9 %
10 SYRINGE (ML) INJECTION EVERY 12 HOURS SCHEDULED
Status: DISCONTINUED | OUTPATIENT
Start: 2021-01-13 | End: 2021-01-13 | Stop reason: HOSPADM

## 2021-01-13 RX ORDER — METOCLOPRAMIDE HYDROCHLORIDE 5 MG/ML
10 INJECTION INTRAMUSCULAR; INTRAVENOUS
Status: DISCONTINUED | OUTPATIENT
Start: 2021-01-13 | End: 2021-01-13 | Stop reason: HOSPADM

## 2021-01-13 RX ORDER — ROCURONIUM BROMIDE 10 MG/ML
INJECTION, SOLUTION INTRAVENOUS PRN
Status: DISCONTINUED | OUTPATIENT
Start: 2021-01-13 | End: 2021-01-13 | Stop reason: SDUPTHER

## 2021-01-13 RX ORDER — LIDOCAINE HYDROCHLORIDE 20 MG/ML
JELLY TOPICAL PRN
Status: DISCONTINUED | OUTPATIENT
Start: 2021-01-13 | End: 2021-01-13 | Stop reason: ALTCHOICE

## 2021-01-13 RX ORDER — DIPHENHYDRAMINE HYDROCHLORIDE 50 MG/ML
12.5 INJECTION INTRAMUSCULAR; INTRAVENOUS
Status: DISCONTINUED | OUTPATIENT
Start: 2021-01-13 | End: 2021-01-13 | Stop reason: HOSPADM

## 2021-01-13 RX ORDER — FENTANYL CITRATE 50 UG/ML
INJECTION, SOLUTION INTRAMUSCULAR; INTRAVENOUS PRN
Status: DISCONTINUED | OUTPATIENT
Start: 2021-01-13 | End: 2021-01-13 | Stop reason: SDUPTHER

## 2021-01-13 RX ADMIN — ROCURONIUM BROMIDE 5 MG: 10 INJECTION INTRAVENOUS at 10:09

## 2021-01-13 RX ADMIN — LIDOCAINE HYDROCHLORIDE 60 MG: 20 INJECTION, SOLUTION INFILTRATION; PERINEURAL at 09:29

## 2021-01-13 RX ADMIN — SODIUM CHLORIDE, POTASSIUM CHLORIDE, SODIUM LACTATE AND CALCIUM CHLORIDE: 600; 310; 30; 20 INJECTION, SOLUTION INTRAVENOUS at 07:54

## 2021-01-13 RX ADMIN — GENTAMICIN SULFATE 120 MG: 40 INJECTION, SOLUTION INTRAMUSCULAR; INTRAVENOUS at 09:24

## 2021-01-13 RX ADMIN — FENTANYL CITRATE 25 MCG: 50 INJECTION, SOLUTION INTRAMUSCULAR; INTRAVENOUS at 10:10

## 2021-01-13 RX ADMIN — PROPOFOL 50 MG: 10 INJECTION, EMULSION INTRAVENOUS at 09:31

## 2021-01-13 RX ADMIN — ONDANSETRON 4 MG: 2 INJECTION INTRAMUSCULAR; INTRAVENOUS at 09:36

## 2021-01-13 RX ADMIN — FENTANYL CITRATE 25 MCG: 50 INJECTION, SOLUTION INTRAMUSCULAR; INTRAVENOUS at 09:44

## 2021-01-13 RX ADMIN — HYDROCODONE BITARTRATE AND ACETAMINOPHEN 2 TABLET: 5; 325 TABLET ORAL at 11:47

## 2021-01-13 RX ADMIN — SODIUM CHLORIDE, POTASSIUM CHLORIDE, SODIUM LACTATE AND CALCIUM CHLORIDE 1000 ML: 600; 310; 30; 20 INJECTION, SOLUTION INTRAVENOUS at 10:49

## 2021-01-13 RX ADMIN — PROPOFOL 150 MG: 10 INJECTION, EMULSION INTRAVENOUS at 09:29

## 2021-01-13 RX ADMIN — FENTANYL CITRATE 50 MCG: 50 INJECTION, SOLUTION INTRAMUSCULAR; INTRAVENOUS at 11:00

## 2021-01-13 RX ADMIN — FENTANYL CITRATE 50 MCG: 50 INJECTION, SOLUTION INTRAMUSCULAR; INTRAVENOUS at 10:32

## 2021-01-13 RX ADMIN — FENTANYL CITRATE 50 MCG: 50 INJECTION, SOLUTION INTRAMUSCULAR; INTRAVENOUS at 10:49

## 2021-01-13 ASSESSMENT — PULMONARY FUNCTION TESTS
PIF_VALUE: 13
PIF_VALUE: 6
PIF_VALUE: 6
PIF_VALUE: 5
PIF_VALUE: 2
PIF_VALUE: 5
PIF_VALUE: 13
PIF_VALUE: 12
PIF_VALUE: 12
PIF_VALUE: 5
PIF_VALUE: 0
PIF_VALUE: 7
PIF_VALUE: 5
PIF_VALUE: 12
PIF_VALUE: 3
PIF_VALUE: 1
PIF_VALUE: 1
PIF_VALUE: 7
PIF_VALUE: 1
PIF_VALUE: 6
PIF_VALUE: 5
PIF_VALUE: 5
PIF_VALUE: 13
PIF_VALUE: 5
PIF_VALUE: 26
PIF_VALUE: 7
PIF_VALUE: 20
PIF_VALUE: 5
PIF_VALUE: 5
PIF_VALUE: 0
PIF_VALUE: 6
PIF_VALUE: 5
PIF_VALUE: 5
PIF_VALUE: 13

## 2021-01-13 ASSESSMENT — PAIN SCALES - GENERAL
PAINLEVEL_OUTOF10: 8
PAINLEVEL_OUTOF10: 8

## 2021-01-13 NOTE — ANESTHESIA PRE PROCEDURE
Department of Anesthesiology  Preprocedure Note       Name:  Daphne Ta   Age:  79 y.o.  :  1950                                          MRN:  [id number]         Date:  2021      Surgeon: Gabriela Thakur):  Jose Sumner MD    Procedure: Procedure(s):  BLADDER BIOPSY  RIGHT RETROGRADE PYELOGRAM  RIGHT URETEROSCOPY RIGHT STENT REMOVAL    Medications prior to admission:   Prior to Admission medications    Medication Sig Start Date End Date Taking? Authorizing Provider   HYDROcodone-acetaminophen (NORCO) 5-325 MG per tablet Take 1 tablet by mouth every 6 hours as needed for Pain. Yes Historical Provider, MD   ondansetron (ZOFRAN ODT) 4 MG disintegrating tablet Take 1 tablet by mouth every 8 hours as needed for Nausea 12/3/20  Yes Tyree Lucero PA-C   oxybutynin (DITROPAN) 5 MG tablet Take 5 mg by mouth 3 times daily   Yes Historical Provider, MD   olmesartan (BENICAR) 20 MG tablet Take 20 mg by mouth daily. Yes Historical Provider, MD   atorvastatin (LIPITOR) 20 MG tablet Take 20 mg by mouth every evening    Yes Historical Provider, MD   Vaginal Lubricant (REPLENS [facility]) Place vaginally Indications: every 3 days     Historical Provider, MD       Current medications:    Current Facility-Administered Medications   Medication Dose Route Frequency Provider Last Rate Last Admin    lactated ringers infusion   Intravenous Continuous Pilar Macclesfield, APRN -  mL/hr at 21 0754 New Bag at 21 0754    lidocaine PF 1 % injection 1 mL  1 mL Intradermal Once PRN Pilar Macclesfield, APRN - CNP        sodium chloride flush 0.9 % injection 10 mL  10 mL Intravenous 2 times per day Pilar Macclesfield, APRN - CNP        sodium chloride flush 0.9 % injection 10 mL  10 mL Intravenous PRN Pilar Macclesfield, APRN - CNP        gentamicin (GARAMYCIN) 120 mg in dextrose 5 % 100 mL IVPB  120 mg Intravenous Once Pilar Macclesfield, APRN - CNP           Allergies:     Allergies Allergen Reactions    Keflex [Cephalexin] Itching    Demerol Other (See Comments)     Vomiting    Medrol [Methylprednisolone] Itching    Other      Medrol dose yin      Bactrim [Sulfamethoxazole-Trimethoprim] Rash     Facial flushing , blotches on chest    Ciprofloxacin Rash    Oxycodone-Acetaminophen Rash    Sulfa Antibiotics Rash       Problem List:    Patient Active Problem List   Diagnosis Code    Atypical ductal hyperplasia of breast N60.99    Acid reflux K21.9    BP (high blood pressure) I10    Combined fat and carbohydrate induced hyperlipemia E78.2    Osteoarthritis of ankle or foot M19.079    Osteoporosis M81.0    Vitamin D deficiency E55.9    Former smoker, stopped smoking in distant past Z87.891    Neoplasm of bladder D49.4    Bladder cancer (HCC) C67.9    Vaginal atrophy N95.2    Multiple thyroid nodules E04.2    Goiter E04.9    Bunion of great toe of right foot M21.611       Past Medical History:        Diagnosis Date    Arthritis     Cancer [address] [address]     bladder    History of blood transfusion     as infant    Hyperlipidemia     meds > 10 yrs    Hypertension     meds > 10 yrs / denies TIA or stroke    Thyroid disease     past biopsy due to nodules       Past Surgical History:        Procedure Laterality Date    BREAST SURGERY  2008    biopsy right breast    COLONOSCOPY      2 previous colonscopy polyps    COLONOSCOPY  04/12/2017    DR Underwood    CYSTOSCOPY Right 12/16/2020    CYSTOSCOPY RIGHT RETROGRADE PYELOGRAM performed by Wesley Cespedes MD at [facility] N/A 12/16/2020    POSSIBLE BIOPSY WITH FULGURATION performed by Wesley Cespedes MD at [facility]  2017    endometriosis / several D & C    [place] COLON CA SCRN NOT [facility] IND N/A 4/12/2017    COLONOSCOPY, PAT DONE  performed by Tony Montejo MD at [facility] Right 2000s    big toe spur    TONSILLECTOMY      as child       Social History: Social History     Tobacco Use    Smoking status: Former Smoker     Packs/day: 0.50     Years: 25.00     Pack years: 12.50     Quit date: 2008     Years since quittin.6    Smokeless tobacco: Never Used   Substance Use Topics    Alcohol use: Yes     Comment: rare                                Counseling given: Not Answered      Vital Signs (Current):   Vitals:    21 0745   BP: (!) 143/71   Pulse: 75   Resp: 20   Temp: 96.9 °F (36.1 °C)   TempSrc: Temporal   SpO2: 98%   Weight: 128 lb (58.1 kg)   Height: 5' 2.5\" (1.588 m)                                              BP Readings from Last 3 Encounters:   21 (!) 143/71   21 (!) 152/78   20 (!) 7851       NPO Status: Time of last liquid consumption:                         Time of last solid consumption:                         Date of last liquid consumption: 21                        Date of last solid food consumption: 21    BMI:   Wt Readings from Last 3 Encounters:   21 128 lb (58.1 kg)   21 128 lb 3.2 oz (58.2 kg)   20 130 lb (59 kg)     Body mass index is 23.04 kg/m². CBC:   Lab Results   Component Value Date    WBC 4.6 2021    RBC 4.34 2021    HGB 13.1 2021    HCT 39.7 2021    MCV 91.6 2021    RDW 13.6 2021     2021       CMP:   Lab Results   Component Value Date     2021    K 4.6 2021     2021    CO2 25 2021    BUN 11 2021    CREATININE 0.89 2021    GFRAA >60.0 2021    LABGLOM >60.0 2021    GLUCOSE 93 2021    PROT 7.1 2020    CALCIUM 9.3 2021    BILITOT 0.3 2020    ALKPHOS 98 2020    AST 23 2020    ALT 14 2020       POC Tests: No results for input(s): POCGLU, POCNA, POCK, POCCL, POCBUN, POCHEMO, POCHCT in the last 72 hours.     Coags: No results found for: PROTIME, INR, APTT HCG (If Applicable): No results found for: PREGTESTUR, PREGSERUM, HCG, HCGQUANT     ABGs: No results found for: PHART, PO2ART, DXB2PRM, BFZ9MBU, BEART, Y2QIPROH     Type & Screen (If Applicable):  No results found for: LABABO, LABRH    Drug/Infectious Status (If Applicable):  No results found for: HIV, HEPCAB    COVID-19 Screening (If Applicable):   Lab Results   Component Value Date    COVID19 Not Detected 01/06/2021         Anesthesia Evaluation    Airway: Mallampati: II  TM distance: >3 FB   Neck ROM: full  Mouth opening: > = 3 FB Dental:          Pulmonary:Negative Pulmonary ROS breath sounds clear to auscultation                             Cardiovascular:    (+) hypertension:,       ECG reviewed  Rhythm: regular                      Neuro/Psych:   Negative Neuro/Psych ROS              GI/Hepatic/Renal:   (+) GERD: well controlled,           Endo/Other: Negative Endo/Other ROS                    Abdominal:           Vascular: negative vascular ROS. Anesthesia Plan      general     ASA 2     (LMA)  Induction: intravenous. MIPS: Prophylactic antiemetics administered. Anesthetic plan and risks discussed with patient. Plan discussed with CRNA.     Attending anesthesiologist reviewed and agrees with Pre Eval content              Herlinda France MD   1/13/2021

## 2021-01-13 NOTE — OP NOTE
Patient has been off all anti-inflammatories. Patient was interviewed and examined prior to the procedure by me. Patient was then taken to the operating room. She received preoperative IV antibiotics. Patient had LMA anesthesia with mild relaxation. She was placed in the dorsolithotomy position and she was prepped and draped in sterile fashion. Initially cystoscopy was carried out with a 23 Citizen of [place] cystoscope. Thank you so much  Cystoscopic evaluation once again showed the tumor involving the right side of the bladder along the right ureteral orifice. At this time the double-J stent was removed. A dual-lumen catheter was placed in the ureter. 2 wires were placed into the right collecting system. The 6.9 Citizen of [place] ureteroscope was used to inspect the distal ureter. There was abnormalities of the ureteral orifice and about 1 to 2 mm into the intramural ureter. Rest of the ureter looked uninvolved from urothelial carcinoma. At this point a 6 x 24 double-J stent was placed into the right collecting system. Position of the stent was verified by fluoroscopy. A 23 [place] resectoscope was then used to resect the rest of the area including tip of the ureteral orifice and around the distal portion of the ureteral stent. Area was then fulgurated. No bleeders were noted. Guadalupe catheter was inserted patient will be sent home with a Guadalupe catheter due to extensive resection performed in and around the ureter. Patient will also be going home with antibiotic. A B&O suppository was placed. Patient tolerated procedure well. There were no complications.   Kenya Molina MD  Electronically signed by Nicolás French MD on 1/13/2021 at 10:34 AM

## 2021-01-13 NOTE — ANESTHESIA POSTPROCEDURE EVALUATION
Department of Anesthesiology  Postprocedure Note    Patient: Katelyn Martell  MRN: [id number]  YOB: 1950  Date of evaluation: 1/13/2021  Time:  10:40 AM     Procedure Summary     Date: 01/13/21 Room / Location: [facility]    Anesthesia Start: 6406 Anesthesia Stop:     Procedures:       BLADDER BIOPSY (N/A )      RIGHT RETROGRADE PYELOGRAM (Right )      RIGHT URETEROSCOPY RIGHT STENT REMOVAL, DOUBLE J STENT REPLACED (Right ) Diagnosis: (BLADDER CANCER)    Surgeons: Corry Lay MD Responsible Provider: Jazzmine Guerrero MD    Anesthesia Type: general ASA Status: 2          Anesthesia Type: general    Vero Phase I:      Vero Phase II:      Last vitals: Reviewed and per EMR flowsheets.        Anesthesia Post Evaluation    Patient location during evaluation: PACU  Patient participation: complete - patient participated  Level of consciousness: awake and alert  Pain score: 1  Airway patency: patent  Nausea & Vomiting: no nausea and no vomiting  Complications: no  Cardiovascular status: hemodynamically stable  Respiratory status: acceptable and face mask  Hydration status: euvolemic  Comments: Report to RN, normal sinus rhythm

## 2021-01-14 ENCOUNTER — TELEPHONE (OUTPATIENT)
Dept: UROLOGY | Age: 71
End: 2021-01-14

## 2021-01-14 NOTE — TELEPHONE ENCOUNTER
Patient states she is having an allergic reaction to the antibiotic she was given yesterday, doxycycline, itching on both arms, has a rash. Advised to stop taking the antibiotic. Does she need a different medication?

## 2021-01-15 ENCOUNTER — OFFICE VISIT (OUTPATIENT)
Dept: UROLOGY | Age: 71
End: 2021-01-15

## 2021-01-15 VITALS
HEIGHT: 63 IN | SYSTOLIC BLOOD PRESSURE: 130 MMHG | DIASTOLIC BLOOD PRESSURE: 88 MMHG | HEART RATE: 79 BPM | BODY MASS INDEX: 22.68 KG/M2 | WEIGHT: 128 LBS

## 2021-01-15 DIAGNOSIS — C67.0 MALIGNANT NEOPLASM OF TRIGONE OF URINARY BLADDER (HCC): Primary | ICD-10-CM

## 2021-01-15 PROCEDURE — 99024 POSTOP FOLLOW-UP VISIT: CPT | Performed by: UROLOGY

## 2021-01-15 NOTE — PROGRESS NOTES
Urology  Status post resection of bladder tumor right ureteral orifice exchange of stent  Indwelling Guadalupe x2 days removed  Urine clear  Patient could not tolerate doxycycline (itching) patient told to stop the medication  Pathology  Below    [address] [place] [address]                                          [phone]   FINAL SURGICAL PATHOLOGY REPORT   Patient Name: Sheila Simpson         Accession No:  [id number]    Age Sex:   1950                   Location:      [facility]   Account No:   [de-identified]                  Collected:     2021   [facility] Rec No:    [id number]                   Received:      2021   Attend Phys:   YOLA LIZARRAGA                Completed:     2021   Perform Phys: Gregory Marcus           FINAL DIAGNOSIS:   RIGHT BLADDER TUMOR BIOPSY:   INVASIVE PAPILLARY UROTHELIAL CARCINOMA       PROCEDURE: BIOPSY   TUMOR SITE: RIGHT BLADDER   HISTOLOGIC TYPE: PAPILLARY UROTHELIAL CARCINOMA, INVASIVE   ASSOCIATED EPITHELIAL LESIONS: NONE IDENTIFIED   HISTOLOGIC GRADE: LOW GRADE   TUMOR CONFIGURATION: PAPILLARY   MUSCULARIS PROPRIA PRESENCE: MUSCULARIS PROPRIA PRESENT   LYMPHOVASCULAR INVASION: NOT IDENTIFIED   TUMOR EXTENSION: TUMOR INVADES MUSCULARIS PROPRIA         Patient will be referred to urologic oncology/robotic surgeon for second opinion  Beth Buckley MD    Original CT images below

## 2021-01-28 ENCOUNTER — TELEPHONE (OUTPATIENT)
Dept: UROLOGY | Age: 71
End: 2021-01-28

## 2021-02-01 ENCOUNTER — HOSPITAL ENCOUNTER (OUTPATIENT)
Dept: INTERVENTIONAL RADIOLOGY/VASCULAR | Age: 71
Discharge: HOME | End: 2021-02-03
Payer: MEDICARE

## 2021-02-01 DIAGNOSIS — C67.2 CANCER OF LATERAL WALL OF URINARY BLADDER (HCC): ICD-10-CM

## 2021-02-01 PROCEDURE — 2709999900 IR PICC WO SQ PORT/PUMP > 5 YEARS

## 2021-02-01 PROCEDURE — 2580000003 HC RX 258: Performed by: INTERNAL MEDICINE

## 2021-02-01 PROCEDURE — 2500000003 HC RX 250 WO HCPCS: Performed by: INTERNAL MEDICINE

## 2021-02-01 PROCEDURE — 36573 INSJ PICC RS&I 5 YR+: CPT

## 2021-02-01 RX ORDER — SODIUM CHLORIDE 0.9 % (FLUSH) 0.9 %
10 SYRINGE (ML) INJECTION EVERY 12 HOURS SCHEDULED
Status: DISCONTINUED | OUTPATIENT
Start: 2021-02-01 | End: 2021-02-04 | Stop reason: HOSPADM

## 2021-02-01 RX ORDER — SODIUM CHLORIDE 0.9 % (FLUSH) 0.9 %
10 SYRINGE (ML) INJECTION PRN
Status: DISCONTINUED | OUTPATIENT
Start: 2021-02-01 | End: 2021-02-04 | Stop reason: HOSPADM

## 2021-02-01 RX ORDER — LIDOCAINE HYDROCHLORIDE 20 MG/ML
5 INJECTION, SOLUTION INFILTRATION; PERINEURAL ONCE
Status: COMPLETED | OUTPATIENT
Start: 2021-02-01 | End: 2021-02-01

## 2021-02-01 RX ORDER — SODIUM CHLORIDE 9 MG/ML
250 INJECTION, SOLUTION INTRAVENOUS ONCE
Status: COMPLETED | OUTPATIENT
Start: 2021-02-01 | End: 2021-02-01

## 2021-02-01 RX ADMIN — LIDOCAINE HYDROCHLORIDE 5 ML: 20 INJECTION, SOLUTION INFILTRATION; PERINEURAL at 09:58

## 2021-02-01 RX ADMIN — SODIUM CHLORIDE 250 ML: 9 INJECTION, SOLUTION INTRAVENOUS at 09:57

## 2021-02-01 ASSESSMENT — PAIN SCALES - GENERAL: PAINLEVEL_OUTOF10: 0

## 2021-04-20 ENCOUNTER — HOSPITAL ENCOUNTER (OUTPATIENT)
Dept: CT IMAGING | Age: 71
Discharge: HOME | End: 2021-04-22
Payer: MEDICARE

## 2021-04-20 VITALS — WEIGHT: 114 LBS | HEIGHT: 62 IN | BODY MASS INDEX: 20.98 KG/M2

## 2021-04-20 DIAGNOSIS — C67.2 MALIGNANT NEOPLASM OF LATERAL WALL OF URINARY BLADDER (HCC): ICD-10-CM

## 2021-04-20 LAB
GFR AFRICAN AMERICAN: >60
GFR NON-AFRICAN AMERICAN: >60
PERFORMED ON: NORMAL
POC CREATININE: 0.9 MG/DL (ref 0.6–1.2)
POC SAMPLE TYPE: NORMAL

## 2021-04-20 PROCEDURE — 6360000004 HC RX CONTRAST MEDICATION: Performed by: INTERNAL MEDICINE

## 2021-04-20 PROCEDURE — 2500000003 HC RX 250 WO HCPCS: Performed by: INTERNAL MEDICINE

## 2021-04-20 PROCEDURE — 74177 CT ABD & PELVIS W/CONTRAST: CPT

## 2021-04-20 RX ORDER — SODIUM CHLORIDE 0.9 % (FLUSH) 0.9 %
10 SYRINGE (ML) INJECTION
Status: DISPENSED | OUTPATIENT
Start: 2021-04-20 | End: 2021-04-20

## 2021-04-20 RX ADMIN — IOPAMIDOL 100 ML: 612 INJECTION, SOLUTION INTRAVENOUS at 13:52

## 2021-04-20 RX ADMIN — BARIUM SULFATE 450 ML: 20 SUSPENSION ORAL at 13:52

## 2023-03-03 LAB
SEDIMENTATION RATE, ERYTHROCYTE: 6 MM/H (ref 0–30)
THYROPEROXIDASE AB (IU/ML) IN SER/PLAS: <28 IU/ML

## 2023-03-04 LAB
ALLERGEN FOOD: CRAB (CHIONOECETES SPP.)IGE (KU/L): <0.1 KU/L
ALLERGEN FOOD: LOBSTER (HOMARUS GAMMARUS) IGE (KU/L): <0.1 KU/L
ALLERGEN FOOD: SHRIMP (P. BOREALIS/MONODON, M. BARBATA/JOYNERI) IGE (KU/L): <0.1 KU/L
IMMUNOCAP IGE: 20.9 KU/L (ref 0–214)
IMMUNOCAP INTERPRETATION: NORMAL

## 2023-03-06 LAB
ANA PATTERN: ABNORMAL
ANA TITER: ABNORMAL
ANTI-CENTROMERE: <0.2 AI
ANTI-CHROMATIN: <0.2 AI
ANTI-DNA (DS): 1 IU/ML
ANTI-JO-1 IGG: <0.2 AI
ANTI-NUCLEAR ANTIBODY (ANA): POSITIVE
ANTI-RIBOSOMAL P: <0.2 AI
ANTI-RNP: <0.2 AI
ANTI-SCL-70: <0.2 AI
ANTI-SM/RNP: <0.2 AI
ANTI-SM: <0.2 AI
ANTI-SSA: <0.2 AI
ANTI-SSB: <0.2 AI
THYROGLOBULIN AB (IU/ML) IN SER/PLAS: <0.9 IU/ML (ref 0–4)

## 2023-03-07 LAB — TRYPTASE: 6.9 MCG/L

## 2023-03-10 LAB — URTICARIA-INDUCING ACTIVITY: 7.7

## 2023-10-30 DIAGNOSIS — C67.9 MALIGNANT NEOPLASM OF URINARY BLADDER, UNSPECIFIED SITE (MULTI): ICD-10-CM

## 2024-01-10 ENCOUNTER — LAB (OUTPATIENT)
Dept: LAB | Facility: LAB | Age: 74
End: 2024-01-10
Payer: MEDICARE

## 2024-01-10 DIAGNOSIS — C67.9 MALIGNANT NEOPLASM OF URINARY BLADDER, UNSPECIFIED SITE (MULTI): ICD-10-CM

## 2024-01-10 LAB
ALBUMIN SERPL BCP-MCNC: 4.4 G/DL (ref 3.4–5)
ALP SERPL-CCNC: 90 U/L (ref 33–136)
ALT SERPL W P-5'-P-CCNC: 13 U/L (ref 7–45)
ANION GAP SERPL CALC-SCNC: 14 MMOL/L (ref 10–20)
AST SERPL W P-5'-P-CCNC: 23 U/L (ref 9–39)
BILIRUB SERPL-MCNC: 0.6 MG/DL (ref 0–1.2)
BUN SERPL-MCNC: 15 MG/DL (ref 6–23)
CALCIUM SERPL-MCNC: 9.1 MG/DL (ref 8.6–10.3)
CHLORIDE SERPL-SCNC: 104 MMOL/L (ref 98–107)
CO2 SERPL-SCNC: 23 MMOL/L (ref 21–32)
CREAT SERPL-MCNC: 1.15 MG/DL (ref 0.5–1.05)
EGFRCR SERPLBLD CKD-EPI 2021: 50 ML/MIN/1.73M*2
ERYTHROCYTE [DISTWIDTH] IN BLOOD BY AUTOMATED COUNT: 13.4 % (ref 11.5–14.5)
GLUCOSE SERPL-MCNC: 86 MG/DL (ref 74–99)
HCT VFR BLD AUTO: 41.2 % (ref 36–46)
HGB BLD-MCNC: 12.8 G/DL (ref 12–16)
MCH RBC QN AUTO: 30.2 PG (ref 26–34)
MCHC RBC AUTO-ENTMCNC: 31.1 G/DL (ref 32–36)
MCV RBC AUTO: 97 FL (ref 80–100)
NRBC BLD-RTO: 0 /100 WBCS (ref 0–0)
PLATELET # BLD AUTO: 243 X10*3/UL (ref 150–450)
POTASSIUM SERPL-SCNC: 3.5 MMOL/L (ref 3.5–5.3)
PROT SERPL-MCNC: 6.9 G/DL (ref 6.4–8.2)
RBC # BLD AUTO: 4.24 X10*6/UL (ref 4–5.2)
SODIUM SERPL-SCNC: 137 MMOL/L (ref 136–145)
WBC # BLD AUTO: 4.5 X10*3/UL (ref 4.4–11.3)

## 2024-01-10 PROCEDURE — 85027 COMPLETE CBC AUTOMATED: CPT

## 2024-01-10 PROCEDURE — 36415 COLL VENOUS BLD VENIPUNCTURE: CPT

## 2024-01-10 PROCEDURE — 80053 COMPREHEN METABOLIC PANEL: CPT

## 2024-01-18 ENCOUNTER — HOSPITAL ENCOUNTER (OUTPATIENT)
Dept: RADIOLOGY | Facility: HOSPITAL | Age: 74
Discharge: HOME | End: 2024-01-18
Payer: MEDICARE

## 2024-01-18 DIAGNOSIS — C67.9 MALIGNANT NEOPLASM OF BLADDER, UNSPECIFIED (MULTI): ICD-10-CM

## 2024-01-18 PROCEDURE — 74177 CT ABD & PELVIS W/CONTRAST: CPT

## 2024-01-18 PROCEDURE — 74177 CT ABD & PELVIS W/CONTRAST: CPT | Performed by: STUDENT IN AN ORGANIZED HEALTH CARE EDUCATION/TRAINING PROGRAM

## 2024-01-18 PROCEDURE — 2550000001 HC RX 255 CONTRASTS: Performed by: UROLOGY

## 2024-01-18 PROCEDURE — 71260 CT THORAX DX C+: CPT | Performed by: STUDENT IN AN ORGANIZED HEALTH CARE EDUCATION/TRAINING PROGRAM

## 2024-01-18 RX ADMIN — IOHEXOL 70 ML: 350 INJECTION, SOLUTION INTRAVENOUS at 09:55

## 2024-01-25 ENCOUNTER — OFFICE VISIT (OUTPATIENT)
Dept: UROLOGY | Facility: CLINIC | Age: 74
End: 2024-01-25
Payer: MEDICARE

## 2024-01-25 VITALS
HEIGHT: 63 IN | HEART RATE: 74 BPM | TEMPERATURE: 97.7 F | DIASTOLIC BLOOD PRESSURE: 94 MMHG | SYSTOLIC BLOOD PRESSURE: 158 MMHG | WEIGHT: 131 LBS | BODY MASS INDEX: 23.21 KG/M2

## 2024-01-25 DIAGNOSIS — C67.9 MALIGNANT NEOPLASM OF URINARY BLADDER, UNSPECIFIED SITE (MULTI): ICD-10-CM

## 2024-01-25 PROCEDURE — 1036F TOBACCO NON-USER: CPT | Performed by: UROLOGY

## 2024-01-25 PROCEDURE — 1159F MED LIST DOCD IN RCRD: CPT | Performed by: UROLOGY

## 2024-01-25 PROCEDURE — 99213 OFFICE O/P EST LOW 20 MIN: CPT | Performed by: UROLOGY

## 2024-01-25 RX ORDER — ATORVASTATIN CALCIUM 20 MG/1
20 TABLET, FILM COATED ORAL
COMMUNITY
Start: 2023-07-26

## 2024-01-25 NOTE — PROGRESS NOTES
"Subjective   Patient ID: Dary García is a 73 y.o. female who presents for CT results. Last seen 1/24/23 when I reviewed her CT scan. There is no evidence of recurrence. There is a small left renal cyst which we will observe. There is a small parastomal hernia. This is not symptomatic and is not affecting her adherence of the stoma appliance. I will see her back in 1 year with repeat imaging and blood work unless new symptoms arise in the interim.     HPI  We reviewed the CT scan results. Patient would like to avoid hernia surgery and was concerned about exercise.   Patient notes that she sometimes finds the cystectomy bag is sometimes \"floppy.\" She is not interested in trying different supplies.     CT c/a/p results  IMPRESSION:  CHEST:  1. No intrathoracic metastatic disease  2. Partially calcific right thyroid nodule measuring 1.8 cm. Finding  could be further evaluated by dedicated nonemergent thyroid  ultrasound if not performed previously.  ABDOMEN-PELVIS:  1. Status post cystectomy with right lower quadrant ileal conduit  creation/ileostomy without complications. No locoregional recurrence  or metastatic disease in the abdomen or pelvis.  2. Other ancillary/chronic findings are unchanged.    Review of Systems  A 12 system review was completed and is negative with the exception of those signs and symptoms noted in the history of present illness.    Objective   Physical Exam  General: in NAD, appears stated age  Head: normocephalic, atraumatic  Respiratory: normal effort, no use of accessory muscles  Cardiovascular: no edema noted  Skin: normal turgor, no rashes  Neurologic: grossly intact, oriented to person/place/time  Psychiatric: mode and affect appropriate     Assessment/Plan     We reviewed the CT scan results. All questions and concerns were addressed. We will see the patient back in 1 year with surveillance CT c/a/p, CBC, and CMP results.     Scribe Attestation  By signing my name below, I, Kasandra " Parisa Li   attest that this documentation has been prepared under the direction and in the presence of Edmund Estes MD.

## 2024-03-19 ENCOUNTER — APPOINTMENT (OUTPATIENT)
Dept: GENERAL RADIOLOGY | Age: 74
End: 2024-03-19
Payer: MEDICARE

## 2024-03-19 ENCOUNTER — HOSPITAL ENCOUNTER (EMERGENCY)
Age: 74
Discharge: HOME | End: 2024-03-19
Attending: STUDENT IN AN ORGANIZED HEALTH CARE EDUCATION/TRAINING PROGRAM
Payer: MEDICARE

## 2024-03-19 VITALS
BODY MASS INDEX: 23.92 KG/M2 | HEART RATE: 70 BPM | DIASTOLIC BLOOD PRESSURE: 80 MMHG | HEIGHT: 62 IN | TEMPERATURE: 97.3 F | WEIGHT: 130 LBS | SYSTOLIC BLOOD PRESSURE: 118 MMHG | OXYGEN SATURATION: 96 % | RESPIRATION RATE: 14 BRPM
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
## 2024-03-19 DIAGNOSIS — S81.801A LEG WOUND, RIGHT, INITIAL ENCOUNTER: ICD-10-CM

## 2024-03-19 PROCEDURE — 2500000003 HC RX 250 WO HCPCS: Performed by: STUDENT IN AN ORGANIZED HEALTH CARE EDUCATION/TRAINING PROGRAM

## 2024-03-19 PROCEDURE — 6370000000 HC RX 637 (ALT 250 FOR IP): Performed by: STUDENT IN AN ORGANIZED HEALTH CARE EDUCATION/TRAINING PROGRAM

## 2024-03-19 PROCEDURE — 73590 X-RAY EXAM OF LOWER LEG: CPT

## 2024-03-19 PROCEDURE — 99283 EMERGENCY DEPT VISIT LOW MDM: CPT

## 2024-03-19 PROCEDURE — 12002 RPR S/N/AX/GEN/TRNK2.6-7.5CM: CPT

## 2024-03-19 RX ORDER — IBUPROFEN 400 MG/1
400 TABLET ORAL EVERY 6 HOURS PRN
Qty: 120 TABLET | Refills: 0 | Status: SHIPPED | OUTPATIENT
Start: 2024-03-19

## 2024-03-19 RX ORDER — LIDOCAINE HYDROCHLORIDE AND EPINEPHRINE 10; 10 MG/ML; UG/ML
20 INJECTION, SOLUTION INFILTRATION; PERINEURAL ONCE
Status: COMPLETED | OUTPATIENT
Start: 2024-03-19 | End: 2024-03-19

## 2024-03-19 RX ORDER — CLINDAMYCIN HYDROCHLORIDE 150 MG/1
300 CAPSULE ORAL ONCE
Status: COMPLETED | OUTPATIENT
Start: 2024-03-19 | End: 2024-03-19

## 2024-03-19 RX ORDER — IBUPROFEN 400 MG/1
400 TABLET ORAL ONCE
Status: COMPLETED | OUTPATIENT
Start: 2024-03-19 | End: 2024-03-19

## 2024-03-19 RX ORDER — AZITHROMYCIN 500 MG/1
500 TABLET, FILM COATED ORAL ONCE
Status: COMPLETED | OUTPATIENT
Start: 2024-03-19 | End: 2024-03-19

## 2024-03-19 RX ORDER — CLINDAMYCIN HYDROCHLORIDE 300 MG/1
300 CAPSULE ORAL 3 TIMES DAILY
Qty: 21 CAPSULE | Refills: 0 | Status: SHIPPED | OUTPATIENT
Start: 2024-03-19 | End: 2024-03-26

## 2024-03-19 RX ORDER — ACETAMINOPHEN 500 MG
1000 TABLET ORAL EVERY 6 HOURS PRN
Qty: 60 TABLET | Refills: 0 | Status: SHIPPED | OUTPATIENT
Start: 2024-03-19

## 2024-03-19 RX ORDER — AZITHROMYCIN 250 MG/1
250 TABLET, FILM COATED ORAL DAILY
Qty: 5 TABLET | Refills: 0 | Status: SHIPPED | OUTPATIENT
Start: 2024-03-19 | End: 2024-03-24

## 2024-03-19 RX ORDER — ACETAMINOPHEN 500 MG
1000 TABLET ORAL ONCE
Status: COMPLETED | OUTPATIENT
Start: 2024-03-19 | End: 2024-03-19

## 2024-03-19 RX ADMIN — CLINDAMYCIN HYDROCHLORIDE 300 MG: 150 CAPSULE ORAL at 12:12

## 2024-03-19 RX ADMIN — IBUPROFEN 400 MG: 400 TABLET, FILM COATED ORAL at 09:32

## 2024-03-19 RX ADMIN — AZITHROMYCIN 500 MG: 500 TABLET, FILM COATED ORAL at 12:12

## 2024-03-19 RX ADMIN — LIDOCAINE HYDROCHLORIDE,EPINEPHRINE BITARTRATE 20 ML: 10; .01 INJECTION, SOLUTION INFILTRATION; PERINEURAL at 09:34

## 2024-03-19 RX ADMIN — ACETAMINOPHEN 1000 MG: 500 TABLET ORAL at 09:32

## 2024-03-19 ASSESSMENT — PAIN DESCRIPTION - ONSET: ONSET: ON-GOING

## 2024-03-19 ASSESSMENT — PAIN DESCRIPTION - LOCATION
LOCATION: LEG
LOCATION: LEG

## 2024-03-19 ASSESSMENT — PAIN - FUNCTIONAL ASSESSMENT
PAIN_FUNCTIONAL_ASSESSMENT: NONE - DENIES PAIN
PAIN_FUNCTIONAL_ASSESSMENT: 0-10

## 2024-03-19 ASSESSMENT — LIFESTYLE VARIABLES
HOW MANY STANDARD DRINKS CONTAINING ALCOHOL DO YOU HAVE ON A TYPICAL DAY: 1 OR 2
HOW OFTEN DO YOU HAVE A DRINK CONTAINING ALCOHOL: MONTHLY OR LESS

## 2024-03-19 ASSESSMENT — PAIN DESCRIPTION - ORIENTATION
ORIENTATION: RIGHT
ORIENTATION: RIGHT

## 2024-03-19 ASSESSMENT — PAIN SCALES - GENERAL
PAINLEVEL_OUTOF10: 9
PAINLEVEL_OUTOF10: 5

## 2024-03-19 ASSESSMENT — PAIN DESCRIPTION - FREQUENCY: FREQUENCY: CONTINUOUS

## 2024-03-19 ASSESSMENT — PAIN DESCRIPTION - DESCRIPTORS: DESCRIPTORS: DISCOMFORT

## 2024-03-19 ASSESSMENT — PAIN DESCRIPTION - PAIN TYPE: TYPE: ACUTE PAIN

## 2024-03-19 NOTE — ED NOTES
Discharge instructions reviewed with patient. Medications reviewed and explained to patient. Patient denies any further questions at this time. Pt encouraged to make follow up appointments with PCP and any speciality referrals.

## 2024-03-19 NOTE — ED NOTES
Pt resting quietly in ED cot w/no s/s of distress noted.   RR even and unlabored.   Pt updated on plan of care.   Will continue to monitor pt.

## 2024-03-19 NOTE — ED TRIAGE NOTES
Pt states neighbors dog ran out of the house and bit rt lower leg. Pt states fall after being bit.

## 2024-03-19 NOTE — ED PROVIDER NOTES
above.  Imaging visualized and interpreted by myself and as noted above.  Given findings, clinical presentation most likely consistent w/ multiple dog bites to the right lower leg as shown above with significant deep soft tissue involvement. Although considered, no gross evidence of acute osseous fractures or retained Fb's at this time on XR imaging. Also without any evidence of acute NV deficits distal to the injury. Given nature of wound, wound loosely approximated in the ED and dressed with bulky dressing and placed in walking boot for additional protection. Recommended follow up with PCP and Wound care. Due to Patient's multiple allergies, discussed with pharmacy and decision made to administer azithromycin and clindamycin. Stable for discharge at this time with strict return precautions if any new or worsening symptoms or findings c/w infection.   Although considered Rabies vaccination, animal appearing to be acting appropriately per the patient and was told that they were UTD on shots, although current owner unable to provide proof.  Pt was administered   Medications   acetaminophen (TYLENOL) tablet 1,000 mg (1,000 mg Oral Given 3/19/24 0932)   ibuprofen (ADVIL;MOTRIN) tablet 400 mg (400 mg Oral Given 3/19/24 0932)   lidocaine-EPINEPHrine 1 %-1:082355 injection 20 mL (20 mLs IntraDERmal Given by Other 3/19/24 0934)   clindamycin (CLEOCIN) capsule 300 mg (300 mg Oral Given 3/19/24 1212)   azithromycin (ZITHROMAX) tablet 500 mg (500 mg Oral Given 3/19/24 1212)       Plan: Discharge home in good condition with meds as noted below and instructions to follow up with PCPand wound. Pt stable and appropriate for further evaluation and management as an outpatient. Standard anticipatory guidance and strict return precautions given if any new or worsening symptoms. Patient understanding and amenable to the POC.    CRITICAL CARE TIME   Total CriticalCare time was 0 minutes, excluding separately reportable

## 2024-03-20 ENCOUNTER — HOSPITAL ENCOUNTER (EMERGENCY)
Age: 74
Discharge: HOME | End: 2024-03-20
Payer: MEDICARE

## 2024-03-20 VITALS
DIASTOLIC BLOOD PRESSURE: 78 MMHG | SYSTOLIC BLOOD PRESSURE: 169 MMHG | TEMPERATURE: 97.8 F | OXYGEN SATURATION: 98 % | RESPIRATION RATE: 18 BRPM | HEART RATE: 82 BPM

## 2024-03-20 DIAGNOSIS — T50.905A MEDICATION REACTION, INITIAL ENCOUNTER: Primary | ICD-10-CM

## 2024-03-20 PROCEDURE — 99282 EMERGENCY DEPT VISIT SF MDM: CPT

## 2024-03-20 ASSESSMENT — LIFESTYLE VARIABLES
HOW OFTEN DO YOU HAVE A DRINK CONTAINING ALCOHOL: NEVER
HOW MANY STANDARD DRINKS CONTAINING ALCOHOL DO YOU HAVE ON A TYPICAL DAY: PATIENT DOES NOT DRINK

## 2024-03-20 ASSESSMENT — ENCOUNTER SYMPTOMS
BACK PAIN: 0
ABDOMINAL PAIN: 0
COUGH: 0
SHORTNESS OF BREATH: 0

## 2024-03-20 NOTE — ED PROVIDER NOTES
[facility] ED  eMERGENCY dEPARTMENT eNCOUnter      Pt Name: Sarah Alaniz  MRN: [id number]  Birthdate 1950  Date of evaluation: 3/20/2024  Provider: GABI Beck CNP      HISTORY OF PRESENT ILLNESS    Sarah Alaniz is a 73 y.o. female who presents to the Emergency Department with rash to arms after taking Zithromax this morning.  Patient states she then took her Clindamycin at noon and had facial swelling and her tongue was burning.  All sx have resolved but she comes to the ER concerned she is allergic to the medication.  She was started on the meds yesterday after getting bit by a dog.          REVIEW OF SYSTEMS       Review of Systems   Constitutional:  Negative for fever.   HENT:  Negative for congestion.    Respiratory:  Negative for cough and shortness of breath.    Cardiovascular:  Negative for chest pain.   Gastrointestinal:  Negative for abdominal pain.   Genitourinary:  Negative for dysuria.   Musculoskeletal:  Negative for arthralgias and back pain.   Skin:  Positive for rash (arms).   All other systems reviewed and are negative.        PAST MEDICAL HISTORY     Past Medical History:   Diagnosis Date    Arthritis     Cancer (HCC)     bladder    History of blood transfusion     as infant    Hyperlipidemia     meds > 10 yrs    Hypertension     meds > 10 yrs / denies TIA or stroke    Thyroid disease     past biopsy due to nodules         SURGICAL HISTORY       Past Surgical History:   Procedure Laterality Date    BREAST SURGERY  2008    biopsy right breast    COLONOSCOPY      2 previous colonscopy polyps    COLONOSCOPY  04/12/2017    DR ARROYO    CYSTOSCOPY Right 12/16/2020    CYSTOSCOPY RIGHT RETROGRADE PYELOGRAM performed by Montana Roberts MD at [facility] [place]    CYSTOSCOPY Right 1/13/2021    RIGHT RETROGRADE PYELOGRAM performed by Montana Roberts MD at [facility] [place]    CYSTOSCOPY W BIOPSY OF BLADDER N/A 12/16/2020    POSSIBLE BIOPSY WITH FULGURATION performed by Montana Roberts MD

## 2024-03-20 NOTE — ED TRIAGE NOTES
Patient reports she was seen here yesterday for a dog bite and prescribed an antibiotic. Patient has a history of allergies to antibiotics. Patient reports she did fine with the medications yesterday, but today started with generalized itching and facial swelling. No distress noted on arrival.

## 2024-03-22 ENCOUNTER — HOSPITAL ENCOUNTER (OUTPATIENT)
Dept: WOUND CARE | Age: 74
Discharge: HOME | End: 2024-03-22
Payer: MEDICARE

## 2024-03-22 VITALS
TEMPERATURE: 97.8 F | RESPIRATION RATE: 16 BRPM | DIASTOLIC BLOOD PRESSURE: 80 MMHG | SYSTOLIC BLOOD PRESSURE: 152 MMHG | HEART RATE: 73 BPM
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
## 2024-03-22 DIAGNOSIS — S81.851A DOG BITE OF RIGHT LOWER LEG, INITIAL ENCOUNTER: Primary | ICD-10-CM

## 2024-03-22 PROCEDURE — 99213 OFFICE O/P EST LOW 20 MIN: CPT

## 2024-03-22 RX ORDER — IBUPROFEN 200 MG
TABLET ORAL ONCE
OUTPATIENT
Start: 2024-03-22 | End: 2024-03-22

## 2024-03-22 RX ORDER — LIDOCAINE HYDROCHLORIDE 20 MG/ML
JELLY TOPICAL ONCE
OUTPATIENT
Start: 2024-03-22 | End: 2024-03-22

## 2024-03-22 RX ORDER — BACITRACIN ZINC AND POLYMYXIN B SULFATE 500; 1000 [USP'U]/G; [USP'U]/G
OINTMENT TOPICAL ONCE
OUTPATIENT
Start: 2024-03-22 | End: 2024-03-22

## 2024-03-22 RX ORDER — GENTAMICIN SULFATE 1 MG/G
OINTMENT TOPICAL ONCE
OUTPATIENT
Start: 2024-03-22 | End: 2024-03-22

## 2024-03-22 RX ORDER — BETAMETHASONE DIPROPIONATE 0.5 MG/G
CREAM TOPICAL ONCE
OUTPATIENT
Start: 2024-03-22 | End: 2024-03-22

## 2024-03-22 RX ORDER — LIDOCAINE 50 MG/G
OINTMENT TOPICAL ONCE
OUTPATIENT
Start: 2024-03-22 | End: 2024-03-22

## 2024-03-22 RX ORDER — LIDOCAINE HYDROCHLORIDE 40 MG/ML
SOLUTION TOPICAL ONCE
OUTPATIENT
Start: 2024-03-22 | End: 2024-03-22

## 2024-03-22 RX ORDER — TRIAMCINOLONE ACETONIDE 1 MG/G
OINTMENT TOPICAL ONCE
OUTPATIENT
Start: 2024-03-22 | End: 2024-03-22

## 2024-03-22 RX ORDER — CLOBETASOL PROPIONATE 0.5 MG/G
OINTMENT TOPICAL ONCE
OUTPATIENT
Start: 2024-03-22 | End: 2024-03-22

## 2024-03-22 RX ORDER — GINSENG 100 MG
CAPSULE ORAL ONCE
OUTPATIENT
Start: 2024-03-22 | End: 2024-03-22

## 2024-03-22 RX ORDER — SODIUM CHLOR/HYPOCHLOROUS ACID 0.033 %
SOLUTION, IRRIGATION IRRIGATION ONCE
OUTPATIENT
Start: 2024-03-22 | End: 2024-03-22

## 2024-03-22 RX ORDER — LIDOCAINE 40 MG/G
CREAM TOPICAL ONCE
OUTPATIENT
Start: 2024-03-22 | End: 2024-03-22

## 2024-03-22 NOTE — DISCHARGE INSTRUCTIONS
[facility]   Physician Orders and Discharge Instructions  [facility]  [address] [place]  [address]  Telephone: [phone]      [phone]      NAME:  Sarah Alaniz          YOB: 1950  MEDICAL RECORD NUMBER:  [id number]    Your  is:  Sara    Home Care/Facility: None    Wound Location: Right Leg    Dressing orders:  1.Cleanse wound(s) with normal saline.  2. Cover with 4x4's and wrap with gauze (colin or kerlix)  3. Change Every other day or Monday, Wednesday, and Friday     Compression: None    Offloading Device: Use crutches and CAM boot while walking (Do not flex foot and calf muscle)    Other Instructions: Pad the CAM boot with gauze to prevent it from rubbing on the wound    Keep all dressings clean, dry and intact.  Keep pressure off the wound(s) at all times.     Follow up visit   1 Week: March 29, 2024 @ 9:45 am    Please give 24 hour notice if unable to keep appointment. [phone]    If you experience any of the following, please call the Wound Care Service at  [phone] or go to the nearest emergency room.   *Increase in pain *Temperature over 101 *Increase in drainage from your wound or a foul odor  *Uncontrolled swelling *Need for compression bandage changes due to slippage, breakthrough drainage       PLEASE NOTE: IF YOU ARE UNABLE TO OBTAIN WOUND SUPPLIES, CONTINUE TO USE THE SUPPLIES YOU HAVE AVAILABLE UNTIL YOU ARE ABLE TO REACH US. IT IS MOST IMPORTANT TO KEEP THE WOUND COVERED AT ALL TIMES    Electronically signed by GABI Wang CNP on 3/22/2024 at 11:00 AM

## 2024-03-22 NOTE — PROGRESS NOTES
[facility]   Progress Note and Procedure Note      Sarah Alaniz  MEDICAL RECORD NUMBER:  [id number]  AGE: 73 y.o.   GENDER: female  : 1950  EPISODE DATE:  3/22/2024    Subjective:     No chief complaint on file.        HISTORY of PRESENT ILLNESS HPI     Sarah Alaniz is a 73 y.o. female who presents today for wound/ulcer evaluation.   History of Wound Context: Presents with dog bite to right lower leg on 3/19. Sutures were placed in ED. TDaP up to date and was discharged on oral abx.   Wound/Ulcer Pain Timing/Severity: intermittent  Quality of pain: throbbing  Severity:  4 / 10   Modifying Factors: Pain worsens with pressure  Associated Signs/Symptoms: pain    Ulcer Identification:  Ulcer Type: traumatic  Contributing Factors: none      PAST MEDICAL HISTORY        Diagnosis Date    Arthritis     Cancer (HCC)     bladder    History of blood transfusion     as infant    Hyperlipidemia     meds > 10 yrs    Hypertension     meds > 10 yrs / denies TIA or stroke    Thyroid disease     past biopsy due to nodules       PAST SURGICAL HISTORY    Past Surgical History:   Procedure Laterality Date    BREAST SURGERY      biopsy right breast    COLONOSCOPY      2 previous colonscopy polyps    COLONOSCOPY  2017    DR TURNER    CYSTOSCOPY Right 2020    CYSTOSCOPY RIGHT RETROGRADE PYELOGRAM performed by Montana Roberts MD at [facility] [place]    CYSTOSCOPY Right 2021    RIGHT RETROGRADE PYELOGRAM performed by Montana Roberts MD at [facility] [place]    CYSTOSCOPY W BIOPSY OF BLADDER N/A 2020    POSSIBLE BIOPSY WITH FULGURATION performed by Montana Roberts MD at [facility] [place]    CYSTOSCOPY W BIOPSY OF BLADDER N/A 2021    BLADDER BIOPSY performed by Montana Roberts MD at [facility]    DILATION AND CURETTAGE OF UTERUS      endometriosis / several D & C    MD COLON CA SCRN NOT HI RSK IND N/A 2017    COLONOSCOPY, PAT DONE  performed by Gomez Turner MD at [facility] [place]    TOE SURGERY Right

## 2024-03-29 ENCOUNTER — HOSPITAL ENCOUNTER (OUTPATIENT)
Dept: WOUND CARE | Age: 74
Discharge: HOME | End: 2024-03-29
Payer: MEDICARE

## 2024-03-29 VITALS
HEART RATE: 71 BPM | SYSTOLIC BLOOD PRESSURE: 149 MMHG | TEMPERATURE: 96.9 F | RESPIRATION RATE: 16 BRPM | DIASTOLIC BLOOD PRESSURE: 75 MMHG

## 2024-03-29 PROBLEM — S81.801A TRAUMATIC OPEN WOUND OF RIGHT LOWER LEG: Status: ACTIVE | Noted: 2024-03-29

## 2024-03-29 PROCEDURE — 99213 OFFICE O/P EST LOW 20 MIN: CPT

## 2024-03-29 RX ORDER — IBUPROFEN 200 MG
TABLET ORAL ONCE
Status: CANCELLED | OUTPATIENT
Start: 2024-03-29 | End: 2024-03-29

## 2024-03-29 RX ORDER — GENTAMICIN SULFATE 1 MG/G
OINTMENT TOPICAL ONCE
Status: CANCELLED | OUTPATIENT
Start: 2024-03-29 | End: 2024-03-29

## 2024-03-29 RX ORDER — CLOBETASOL PROPIONATE 0.5 MG/G
OINTMENT TOPICAL ONCE
Status: CANCELLED | OUTPATIENT
Start: 2024-03-29 | End: 2024-03-29

## 2024-03-29 RX ORDER — BETAMETHASONE DIPROPIONATE 0.5 MG/G
CREAM TOPICAL ONCE
Status: CANCELLED | OUTPATIENT
Start: 2024-03-29 | End: 2024-03-29

## 2024-03-29 RX ORDER — LIDOCAINE HYDROCHLORIDE 20 MG/ML
JELLY TOPICAL ONCE
Status: CANCELLED | OUTPATIENT
Start: 2024-03-29 | End: 2024-03-29

## 2024-03-29 RX ORDER — SODIUM CHLOR/HYPOCHLOROUS ACID 0.033 %
SOLUTION, IRRIGATION IRRIGATION ONCE
Status: CANCELLED | OUTPATIENT
Start: 2024-03-29 | End: 2024-03-29

## 2024-03-29 RX ORDER — LIDOCAINE 40 MG/G
CREAM TOPICAL ONCE
Status: CANCELLED | OUTPATIENT
Start: 2024-03-29 | End: 2024-03-29

## 2024-03-29 RX ORDER — BACITRACIN ZINC AND POLYMYXIN B SULFATE 500; 1000 [USP'U]/G; [USP'U]/G
OINTMENT TOPICAL ONCE
Status: CANCELLED | OUTPATIENT
Start: 2024-03-29 | End: 2024-03-29

## 2024-03-29 RX ORDER — TRIAMCINOLONE ACETONIDE 1 MG/G
OINTMENT TOPICAL ONCE
Status: CANCELLED | OUTPATIENT
Start: 2024-03-29 | End: 2024-03-29

## 2024-03-29 RX ORDER — LIDOCAINE 50 MG/G
OINTMENT TOPICAL ONCE
Status: CANCELLED | OUTPATIENT
Start: 2024-03-29 | End: 2024-03-29

## 2024-03-29 RX ORDER — GINSENG 100 MG
CAPSULE ORAL ONCE
Status: CANCELLED | OUTPATIENT
Start: 2024-03-29 | End: 2024-03-29

## 2024-03-29 RX ORDER — LIDOCAINE HYDROCHLORIDE 40 MG/ML
SOLUTION TOPICAL ONCE
Status: CANCELLED | OUTPATIENT
Start: 2024-03-29 | End: 2024-03-29

## 2024-03-29 NOTE — PROGRESS NOTES
[facility]   Progress Note and Procedure Note      Sarah Alaniz  MEDICAL RECORD NUMBER:  [id number]  AGE: 73 y.o.   GENDER: female  : 1950  EPISODE DATE:  3/29/2024    Subjective:     Chief Complaint   Patient presents with    Wound Check         HISTORY of PRESENT ILLNESS HPI     Sarah Alaniz is a 73 y.o. female who presents today for wound/ulcer evaluation.   History of Wound Context: Presents with dog bite to right lower leg on 3/19. Sutures were placed in ED.   Wound/Ulcer Pain Timing/Severity: none  Quality of pain: N/A  Severity:  0 / 10   Modifying Factors: None  Associated Signs/Symptoms: none    Ulcer Identification:  Ulcer Type: traumatic  Contributing Factors: none      PAST MEDICAL HISTORY        Diagnosis Date    Arthritis     Cancer (HCC)     bladder    History of blood transfusion     as infant    Hyperlipidemia     meds > 10 yrs    Hypertension     meds > 10 yrs / denies TIA or stroke    Thyroid disease     past biopsy due to nodules       PAST SURGICAL HISTORY    Past Surgical History:   Procedure Laterality Date    BREAST SURGERY      biopsy right breast    COLONOSCOPY      2 previous colonscopy polyps    COLONOSCOPY  2017    DR TURNER    CYSTOSCOPY Right 2020    CYSTOSCOPY RIGHT RETROGRADE PYELOGRAM performed by Montana Roberts MD at [facility] [place]    CYSTOSCOPY Right 2021    RIGHT RETROGRADE PYELOGRAM performed by Montana Roberts MD at [facility]    CYSTOSCOPY W BIOPSY OF BLADDER N/A 2020    POSSIBLE BIOPSY WITH FULGURATION performed by Montana Roberts MD at [facility]    CYSTOSCOPY W BIOPSY OF BLADDER N/A 2021    BLADDER BIOPSY performed by Montana Roberts MD at [facility] [place]    DILATION AND CURETTAGE OF UTERUS      endometriosis / several D & C    MN COLON CA SCRN NOT HI RSK IND N/A 2017    COLONOSCOPY, PAT DONE  performed by Gomez Turner MD at [facility] [place]    TOE SURGERY Right 2000s    big toe spur    TONSILLECTOMY      as child    URETER

## 2024-03-29 NOTE — DISCHARGE INSTRUCTIONS
Wound Clinic Physician Orders and Discharge Instructions  [address] [address]  Telephone: [phone]     FAX [phone]    NAME:  Sarah Alaniz  YOB: 1950  MEDICAL RECORD NUMBER:  [id number]  DATE:  3/29/2024    Congratulations!! You have completed your treatment.   1. Return to your Primary Care Physician for all your health issues.   2. Resume your ordinary activities as tolerated.   3. Take your medications as prescribed by your primary care physician.   4. Check your skin daily for cracks, bruises, sores, or dryness. Use a moisturizer as needed.   5. Clean and dry your skin, using mild soap and warm water (not hot).   6. Avoid alcohol and caffeine and do not smoke.   7. Maintain a nutritious diet.   8. Avoid pressure on your wound site. Keep your legs elevated above the level of the heart whenever possible.   9. Continue to use wraps/stockings/compression as prescribed.   10. Replace compression stockings every four to six months as needed to ensure proper fit.   11. Wear well-fitting shoes and leg garments.     Other Instructions: You can shower normally, you can shower with steri-strips on; after one week you can take them off. (If they fall off before then it is ok)    THANK YOU FOR ALLOWING US TO SERVE YOU. PLEASE CALL IF YOU DEVELOP ANOTHER WOUND. [phone]             Electronically signed by Sara Newman RN on 3/29/2024 at 10:17 AM     Electronically signed by GABI Wang CNP on 3/29/2024 at 10:20 AM

## 2025-01-13 ENCOUNTER — LAB (OUTPATIENT)
Dept: LAB | Facility: LAB | Age: 75
End: 2025-01-13
Payer: MEDICARE

## 2025-01-13 DIAGNOSIS — C67.9 MALIGNANT NEOPLASM OF URINARY BLADDER, UNSPECIFIED SITE (MULTI): ICD-10-CM

## 2025-01-13 LAB
ALBUMIN SERPL BCP-MCNC: 4.5 G/DL (ref 3.4–5)
ALP SERPL-CCNC: 84 U/L (ref 33–136)
ALT SERPL W P-5'-P-CCNC: 14 U/L (ref 7–45)
ANION GAP SERPL CALC-SCNC: 13 MMOL/L (ref 10–20)
AST SERPL W P-5'-P-CCNC: 22 U/L (ref 9–39)
BILIRUB SERPL-MCNC: 0.7 MG/DL (ref 0–1.2)
BUN SERPL-MCNC: 16 MG/DL (ref 6–23)
CALCIUM SERPL-MCNC: 9.1 MG/DL (ref 8.6–10.3)
CHLORIDE SERPL-SCNC: 104 MMOL/L (ref 98–107)
CO2 SERPL-SCNC: 26 MMOL/L (ref 21–32)
CREAT SERPL-MCNC: 1.11 MG/DL (ref 0.5–1.05)
EGFRCR SERPLBLD CKD-EPI 2021: 52 ML/MIN/1.73M*2
ERYTHROCYTE [DISTWIDTH] IN BLOOD BY AUTOMATED COUNT: 13.7 % (ref 11.5–14.5)
GLUCOSE SERPL-MCNC: 85 MG/DL (ref 74–99)
HCT VFR BLD AUTO: 40.3 % (ref 36–46)
HGB BLD-MCNC: 12.8 G/DL (ref 12–16)
MCH RBC QN AUTO: 30 PG (ref 26–34)
MCHC RBC AUTO-ENTMCNC: 31.8 G/DL (ref 32–36)
MCV RBC AUTO: 95 FL (ref 80–100)
NRBC BLD-RTO: 0 /100 WBCS (ref 0–0)
PLATELET # BLD AUTO: 224 X10*3/UL (ref 150–450)
POTASSIUM SERPL-SCNC: 4 MMOL/L (ref 3.5–5.3)
PROT SERPL-MCNC: 6.6 G/DL (ref 6.4–8.2)
RBC # BLD AUTO: 4.26 X10*6/UL (ref 4–5.2)
SODIUM SERPL-SCNC: 139 MMOL/L (ref 136–145)
WBC # BLD AUTO: 3.7 X10*3/UL (ref 4.4–11.3)

## 2025-01-13 PROCEDURE — 85027 COMPLETE CBC AUTOMATED: CPT

## 2025-01-13 PROCEDURE — 80053 COMPREHEN METABOLIC PANEL: CPT

## 2025-01-20 ENCOUNTER — HOSPITAL ENCOUNTER (OUTPATIENT)
Dept: RADIOLOGY | Facility: HOSPITAL | Age: 75
Discharge: HOME | End: 2025-01-20
Payer: MEDICARE

## 2025-01-20 DIAGNOSIS — C67.9 MALIGNANT NEOPLASM OF URINARY BLADDER, UNSPECIFIED SITE (MULTI): ICD-10-CM

## 2025-01-20 PROCEDURE — 74177 CT ABD & PELVIS W/CONTRAST: CPT

## 2025-01-20 PROCEDURE — 2550000001 HC RX 255 CONTRASTS: Performed by: UROLOGY

## 2025-01-20 RX ADMIN — IOHEXOL 75 ML: 350 INJECTION, SOLUTION INTRAVENOUS at 08:11

## 2025-01-28 ENCOUNTER — APPOINTMENT (OUTPATIENT)
Dept: UROLOGY | Facility: CLINIC | Age: 75
End: 2025-01-28
Payer: MEDICARE

## 2025-01-28 VITALS — HEART RATE: 75 BPM | DIASTOLIC BLOOD PRESSURE: 88 MMHG | SYSTOLIC BLOOD PRESSURE: 158 MMHG

## 2025-01-28 DIAGNOSIS — C67.9 MALIGNANT NEOPLASM OF URINARY BLADDER, UNSPECIFIED SITE (MULTI): ICD-10-CM

## 2025-01-28 PROCEDURE — 1036F TOBACCO NON-USER: CPT | Performed by: UROLOGY

## 2025-01-28 PROCEDURE — 99213 OFFICE O/P EST LOW 20 MIN: CPT | Performed by: UROLOGY

## 2025-01-28 PROCEDURE — G2211 COMPLEX E/M VISIT ADD ON: HCPCS | Performed by: UROLOGY

## 2025-01-28 PROCEDURE — 1159F MED LIST DOCD IN RCRD: CPT | Performed by: UROLOGY

## 2025-01-28 ASSESSMENT — PATIENT HEALTH QUESTIONNAIRE - PHQ9
2. FEELING DOWN, DEPRESSED OR HOPELESS: NOT AT ALL
SUM OF ALL RESPONSES TO PHQ9 QUESTIONS 1 AND 2: 0
1. LITTLE INTEREST OR PLEASURE IN DOING THINGS: NOT AT ALL

## 2025-01-28 ASSESSMENT — ENCOUNTER SYMPTOMS
DEPRESSION: 0
OCCASIONAL FEELINGS OF UNSTEADINESS: 0
LOSS OF SENSATION IN FEET: 0

## 2025-01-28 NOTE — PROGRESS NOTES
Subjective   Patient ID: Dary García is a 74 y.o. female who presents for Results. Last seen 1/25/24 when We reviewed the CT scan results. All questions and concerns were addressed. We will see the patient back in 1 year with surveillance CT c/a/p, CBC, and CMP results.     HPI  The patient sates she is feeling well but there have been many other things going on in her life.   The patient reports constipation intermittently ever since the stoma was placed.     Over the interval she was bitten by a dog, her  was diagnosed with dementia, and other concerns. She will be unable to leave her  alone for extended periods now.     CT C/A/P w contrast  IMPRESSION:  Status post cystectomy with ileal conduit. The surgical staples  associated with the ileal conduit are now with bowel in the abdominal  wall where they were previously intra-abdominal. 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  No evidence of metastatic disease.    CMP Results  Component  Ref Range & Units 2 wk ago  (1/13/25) 1 yr ago  (1/10/24) 2 yr ago  (1/10/23) 2 yr ago  (1/10/23) 2 yr ago  (7/5/22) 2 yr ago  (7/5/22) 3 yr ago  (12/27/21)   Glucose  74 - 99 mg/dL 85 86  85  90 94   Sodium  136 - 145 mmol/L 139 137  138  137 138   Potassium  3.5 - 5.3 mmol/L 4.0 3.5  3.5  4.3 4.4   Chloride  98 - 107 mmol/L 104 104  105  104 102   Bicarbonate  21 - 32 mmol/L 26 23  27  28 28   Anion Gap  10 - 20 mmol/L 13 14  10  9 Low  12   Urea Nitrogen  6 - 23 mg/dL 16 15  16  13 20   Creatinine  0.50 - 1.05 mg/dL 1.11 High  1.15 High   1.17 High   1.20 High  1.23 High    eGFR  >60 mL/min/1.73m*2 52 Low  50 Low  CM        Comment: Calculations of estimated GFR are performed using the 2021 CKD-EPI Study Refit equation without the race variable for the IDMS-Traceable creatinine methods.  https://jasn.asnjournals.org/content/early/2021/09/22/ASN.4365757955   Calcium  8.6 - 10.3 mg/dL 9.1 9.1  9.1  9.2 9.2   Albumin  3.4 - 5.0 g/dL 4.5  4.4 4.4  4.3  4.2   Alkaline Phosphatase  33 - 136 U/L 84 90 98  88  101   Total Protein  6.4 - 8.2 g/dL 6.6 6.9 6.8  6.9  7.0   AST  9 - 39 U/L 22 23 21 21 19   Bilirubin, Total  0.0 - 1.2 mg/dL 0.7 0.6 0.5  0.5  0.5   ALT  7 - 45 U/L 14 13 CM 13 CM  13 CM  10 CM     CBC Results  Component  Ref Range & Units 2 wk ago  (1/13/25) 1 yr ago  (1/10/24) 2 yr ago  (1/10/23) 2 yr ago  (7/5/22) 3 yr ago  (12/27/21) 3 yr ago  (6/14/21) 3 yr ago  (6/13/21)   WBC  4.4 - 11.3 x10*3/uL 3.7 Low  4.5 3.8 Low  R 3.6 Low  R 3.7 Low  R 4.2 Low  R 3.6 Low  R   nRBC  0.0 - 0.0 /100 WBCs 0.0 0.0 0.0 R 0.0 R 0.0 R 0.0 R 0.0 R   RBC  4.00 - 5.20 x10*6/uL 4.26 4.24 4.17 R 4.01 R 3.87 Low  R 2.82 Low  R 2.56 Low  R   Hemoglobin  12.0 - 16.0 g/dL 12.8 12.8 12.5 12.1 11.6 Low  8.4 Low  7.8 Low    Hematocrit  36.0 - 46.0 % 40.3 41.2 39.9 38.2 37.1 28.5 Low  25.7 Low    MCV  80 - 100 fL 95 97 96 95 96 101 High  100   MCH  26.0 - 34.0 pg 30.0 30.2        MCHC  32.0 - 36.0 g/dL 31.8 Low  31.1 Low  31.3 Low  31.7 Low  31.3 Low  29.5 Low  30.4 Low    RDW  11.5 - 14.5 % 13.7 13.4 13.4 13.8 14.0 14.1 13.7   Platelets  150 - 450 x10*3/uL 224 243 211 R 225 R 198 R 242 R 232 R       Review of Systems  A 12 system review was completed and is negative with the exception of those signs and symptoms noted in the history of present illness.    Objective   Physical Exam  General: in NAD, appears stated age  Head: normocephalic, atraumatic  Respiratory: normal effort, no use of accessory muscles  Cardiovascular: no edema noted  Skin: normal turgor, no rashes  Neurologic: grossly intact, oriented to person/place/time  Psychiatric: mode and affect appropriate     Assessment/Plan   Problem List Items Addressed This Visit    None  Visit Diagnoses         Codes    Malignant neoplasm of urinary bladder, unspecified site (Multi)     C67.9    Relevant Orders    CT abdomen pelvis w IV contrast    CBC    Comprehensive metabolic panel          The patient has stable  symptoms. Order CMP, CBC, and CT C/A/P for 1 year and follow-up with results with one of our CNPs.     Follow-up in 1 year for continued management of TCC bladder s/p ileal conduit diversion.     Scribe Attestation  By signing my name below, I, Parisa Vernon   attest that this documentation has been prepared under the direction and in the presence of Edmund Estes MD.

## 2025-02-12 ENCOUNTER — TELEPHONE (OUTPATIENT)
Dept: UROLOGY | Facility: CLINIC | Age: 75
End: 2025-02-12
Payer: MEDICARE

## 2025-02-12 DIAGNOSIS — K43.5 PARASTOMAL HERNIA WITHOUT OBSTRUCTION OR GANGRENE: Primary | ICD-10-CM

## 2025-02-14 DIAGNOSIS — K43.5 PARASTOMAL HERNIA WITHOUT OBSTRUCTION OR GANGRENE: ICD-10-CM

## 2025-02-17 ASSESSMENT — ENCOUNTER SYMPTOMS
DIAPHORESIS: 0
ABDOMINAL PAIN: 0
ACTIVITY CHANGE: 0
CHEST TIGHTNESS: 0
UNEXPECTED WEIGHT CHANGE: 0
CHILLS: 0
LIGHT-HEADEDNESS: 0
ANAL BLEEDING: 0
DYSURIA: 0
CONSTIPATION: 0
SHORTNESS OF BREATH: 0
COUGH: 0
DIARRHEA: 0
NAUSEA: 0
HEMATURIA: 0
RECTAL PAIN: 0
APPETITE CHANGE: 0
BLOOD IN STOOL: 0
DIZZINESS: 0
VOMITING: 0
DIFFICULTY URINATING: 0
FATIGUE: 0
WEAKNESS: 0
FEVER: 0
PALPITATIONS: 0

## 2025-02-17 NOTE — PROGRESS NOTES
Dary García  [id number]   02/17/25  8:51 AM    HPI/Subjective:  Dary García is a 74 y.o. female with history of HTN, bladder cancer s/p cystectomy with ileal conduit (2021) who is referred to clinic by Edmund Estes MD for evaluation of a/an parastomal hernia(s).    They note a bulge around her stoma site which does seem to be increasing in size over time.    Symptomatically, this patient experiences pain  intermittently , which is stable.    They have had no episode(s) of incarceration.    They have had no episode(s) of obstructive symptoms attributed to the hernia.    Had some difficulty pouching but using appliance adapter that helps and gets 3 days out of appliance.    They have had no prior repairs.    Past surgical history is otherwise notable for robotic assisted radical cystectomy/anterior exenteration, bilateral pelvic lymph node dissection, creation of ileal conduit (5/14/21), transurethral section of bladder tumor large, right ureteroscopy, right double-J stent placement (1/13/21), cystoscopy right retrograde pyelogram, bladder biopsies, TURBT neoplasm involving right ureter, right double-J stent placement (12/16/20), dilation and curettage, salivary gland surgery    From a pain history standpoint,  Behavioral health history - None  Opioid substance use - None    From a functional/activity standpoint,  Ability to perform ADLs in 30 days prior to surgery - Independent  Employment - No employment  Sporting Activity - Intense (greater than once per week) - walks dogs 3-4 times daily  BMI - Body mass index is 22.79 kg/m².    Past medical history is significant for:  HLD, HTN, CKD3, bladder cancer, vitamin D deficiency, thyroid nodules    Hepatic insufficiency or liver failure - No  Ascites - No  Hypertension - No  Diabetes mellitus - No   Dialysis (acute or chronic renal failure requiring dialysis within 2 weeks prior to surgery) - None  COPD - No  Dyspnea - No  Antiplatelet medications  excluding aspirin - No  Anticoagulation medications - No  Aspirin - No  Immunosuppression - No  Nicotine use in relation to OR date - No, former  History of AAA - No  Collagen vascular disorder - No  Congestive heart failure - No  Active pregnancy - No    Review of Systems   Constitutional:  Negative for activity change, appetite change, chills, diaphoresis, fatigue, fever and unexpected weight change.   Respiratory:  Negative for cough, chest tightness and shortness of breath.    Cardiovascular:  Negative for palpitations and leg swelling.   Gastrointestinal:  Negative for abdominal pain, anal bleeding, blood in stool, constipation, diarrhea, nausea, rectal pain and vomiting.   Genitourinary:  Negative for difficulty urinating, dysuria and hematuria.   Neurological:  Negative for dizziness, weakness and light-headedness.          Objective:  Body mass index is 22.79 kg/m².  Physical Exam  Vitals reviewed. Exam conducted with a chaperone present.   Constitutional:       General: She is not in acute distress.     Appearance: Normal appearance. She is not ill-appearing.   HENT:      Head: Normocephalic.      Mouth/Throat:      Mouth: Mucous membranes are moist.   Eyes:      Extraocular Movements: Extraocular movements intact.      Pupils: Pupils are equal, round, and reactive to light.   Cardiovascular:      Rate and Rhythm: Normal rate and regular rhythm.      Pulses: Normal pulses.      Heart sounds: Normal heart sounds. No murmur heard.  Pulmonary:      Effort: Pulmonary effort is normal. No respiratory distress.      Breath sounds: Normal breath sounds. No wheezing, rhonchi or rales.   Chest:      Chest wall: No tenderness.   Abdominal:      General: There is no distension.      Palpations: There is no mass.      Tenderness: There is no abdominal tenderness. There is no guarding or rebound.      Hernia: A hernia is present.   Musculoskeletal:         General: No swelling or deformity.      Cervical back: Neck  supple. No rigidity.      Right lower leg: No edema.      Left lower leg: No edema.   Skin:     General: Skin is warm.      Coloration: Skin is not jaundiced or pale.   Neurological:      General: No focal deficit present.      Mental Status: She is alert and oriented to person, place, and time. Mental status is at baseline.   Psychiatric:         Mood and Affect: Mood normal.         Behavior: Behavior normal.         Thought Content: Thought content normal.         Judgment: Judgment normal.     Imaging consisting of CT Chest Abdomen / Pelvis (1/20/25) was reviewed personally and was notable for relatively small parastomal hernia - no overt evidence of other bowel getting out or obstruction.    Assessment/Plan:  Dary García is a 74 y.o. female with history of HTN, bladder cancer s/p cystectomy with ileal conduit (2021) who presents with a/an  relatively asymptomatic  parastomal hernia(s), having undergone no prior repairs.    Given that she's not having significant difficulty pouching or other particularly bothersome symptoms will plan to continue to monitor symptoms for the time being, with patient to seek urgent medical attention for any symptoms of incarceration or obstruction. 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 Will plan to check in in 6 months to reevaluate symptoms or sooner if needed.    The patient did not provide consent for surgery and participation in the [facility] at this clinic visit.    Portions of medical record reviewed for pertinent issues including active problem list, medication list, allergies, social history, health maintenance, notes from previous encounters, lab results, and imaging.     I am billing a  modifier as I am assuming management of this complex condition and anticipate having a longitudinal relationship with this patient for  symptomatic assessment and potential surgical intervention.    Patrick Patrick MD  Assistant Professor of Surgery  Division of General Surgery  Department of Surgery          5/14/21 Procedure:  Robotic assisted radical cystectomy/Anterior Exenteration, Bilateral pelvic lymph node dissection; Creation of Ileal Conduit  Operative Report:   INDICATIONS: This is an 70  y.o. F with muscle invasive bladder cancer s/p Neoadjuvant chemotherapy  now presents for a cystectomy. The risks, benefits, and alternatives were discussed with the patient. Informed consent was obtained.      DESCRIPTION OF PROCEDURE: The patient is brought to the operating room, placed in supine position. General anesthesia is induced. Once she was adequately anesthetized, her legs are placed in Felton stirrups and abducted. Arms are placed to his side and  appropriately padded. His lower abdomen and genitalia are shaved, prepped, and draped in the usual sterile fashion. He is then placed in the extreme Trendelenburg position. A Winkler catheter is placed and the bladder is aspirated dry. We then introduced  a Veress needle just above the umbilicus. A saline drop test is used to confirm placement, and we then insufflated the abdomen to 15 mmHg. An 8 mm laparoscopic trocar is placed just above the umbilicus. We then inserted the laparoscope and inspected.  No adhesions are noted in the pelvis.  We placed our left-sided trocars under direct vision in their standard positions. We then placed the right-sided trocars in their standard positions under direct vision. We then docked the da Elizabeth robot, and I moved  to the surgeon's console. I Attention is then turned to the left side. We opened the retroperitoneum on this side. We identified the ureter and dissected free proximally and distally all the way down to the bladder. Distal ends doubly clipped and divided.    The gonadal vein was then ligated proximally between clips.  The IP ligament and uterine  pedicle was then controled with a vessel sealer.  The iliac vessels and the obturator nerve were then identified and the lateral pedicle was established.  The lateral  and medial pedicles are then divided with the Vessell sealer down to the level of the endopelvic fascia on the left. Attention is then turned to the right side. We again identified the ureter in the retroperitoneum. It is dissected free proximally and  distally all the way down to the bladder. The distal ends doubly clipped and divided.  In a similar fashion, on the right side,  the gonadal vein was controlled proximally between weck clips, and the  the ovary and fallopian tube were dissected off of  the side wall.  The iliac vessels and obturator nerve are identified. We then again divided the pedicles on this side with the Vessell sealer down to the endopelvic fascia.   A sponge stick was placed in the vagina and we made an opening in the posterior  culdesac onto the sponge stick.  The vagina was then dissected, leaving the anterior portion of the vagina with the specimen.  Attention is then turned anteriorly. We incised the peritoneum just lateral to the medial umbilical ligament bilaterally. We  divided the urachus and developed the space of Retzius. The DVC was controlled with electrocautery. The urethra is then dissected free. The previously placed catheters withdrawn. At this time the specimen was free. The specimen was then placed in an endocatch  bag and extracted through the vagina.  The vagina was then reconstructed in a clamshell fashion using 2-0 V-Lock sutures.  The vagina was packed with vaginal packing with premarin cream. We copiously irrigated and inspected. There was no bleeding noted  in the pelvis. We then performed our pelvic lymph node dissection. The borders of our dissection are the bifurcation of the aorta proximally, the node of Thornton distally, the obturator nerve posteriorly, and the pelvic sidewall laterally. Feeding  lymphatics  are controlled with electrocautery or Hem-o-ashanti clips. The brett packet is placed in the pelvis. Attention is then turned to the left side. The same lymph node template is utilized, and we dissected free the brett packets again. The feeding lymphatics  are controlled with either electrocautery or surgical clips. This brett packet is also placed in the pelvis and the specimen is bagged and placed in the right upper quadrant. We again copiously irrigated and inspected. No further bleeding is noted. Tisseal  was sprayed into the pelvis for hemostasis.  The left ureter was placed under the sigmoid colon.  We then identified the ileocecal valve and tagged a segment of ileum 15 cm proximal to this. At this point, we removed our instruments and undocked the da  Elizabeth robot. We widened our incision just above the umbilicus and retrieved the specimens. We then delivered the right and left ureter and ileum up through this incision.   The clip holding the left ureter did become dislodged and needed to be reidentified  and again pulled out through the incision.  We identified a segment to use for the ileal conduit. The mesentery is divided using the ligature. The bowel was divided using the CASSIDY stapler. The ileal conduit segments dropped inferiorly and then bowel continuity  is reestablished with an additional CASSIDY and TA stapler.   The TA suture line was oversewed with 2-0 silk. The mesenteric trap is closed with interrupted 3-0 vicryl stitches. We then opened the distal end of the ileal conduit and flushed it of its contents.  We spatulated the left and right ureters, and we sent the distal ends for final margin. We initiated the ureteral anostomosis in a webb fashion suturing the posterior leaflets of the ureters together with 5-0 PDS. Prior to completing the anastomosis,  a single J-stent is placed in the right ureter. We attempted to place the stent into the left ureter however the ureteral anastomosis was  disrupted.  The decision was made to proceed with a Rosa anastomosis and the right ureter was then completed with  5-0 PDS and noted to be water tight on leak testing.  The stent was affixed to the conduit with a 3-0 Chromic stitch. The left ureter was then cannulated with a sensor wire.  It appeared that our previous attempt to place a stent was submucosal. A 6 x  26 JJ ureteral stent was placed over the wire.  Drainage of urine confirmed placement.  At this time the anastomosis was completed with interuppted 5-0 PDS in usual rosa fashion.  The ileal conduit segment is filled with methylene blue and tested.  The anastomoses appeared watertight. At this point, we identified the stoma site. We removed the skin overlying the site and then dissected down onto the fascia. A cruciate incision is made in the fascia. Stay sutures using 2-0 Vicryl are then placed  in the fascia. We then delivered the ileal conduit segment up through this defect and secured to the fascia with the previously placed 2-0 Vicryl sutures. A rosebud stoma is then created using interrupted 3-0 Vicryl sutures. The stent (Right) was affixed  to the stoma using 3-0 Chromic as well. We then copiously irrigated and inspected the abdomen. No further bleeding is noted from any of the trocar sites. A Jatinder-Acevedo drain is delivered through one of our left lateral trocar sites and left to drain  in the pelvis. It is secured to the skin with an 0 silk suture. The fascia is then closed at the extraction site with interrupted 0 Vicryl sutures. The wounds are copiously irrigated and anesthetized with 0.5% Marcaine and closed with 4-0 Vicryl and Dermabond.  The patient is awakened and taken to the PACU in stable condition.

## 2025-02-17 NOTE — PATIENT INSTRUCTIONS
"Patient Education     Abdominal wall hernias   The Basics   Written by the doctors and editors at [facility]   What is an abdominal wall hernia? -- The internal organs and tissues in the belly are held in place by a tough outer wall of tissue called the \"abdominal wall.\" An abdominal hernia is an area in that wall that is weak or torn. Often, when there is a hernia, organs or tissues that are normally held in place by the abdominal wall bulge or stick out through the weak or torn spot.  The size of the bulge can change depending on how much pressure is put on the abdominal wall. For example, straining when coughing or having a bowel movement can make a hernia look bigger. Lying down overnight can make the hernia look smaller, or even disappear, in the morning.  There are many different kinds of abdominal wall hernias (figure 1).  What are the symptoms of abdominal wall hernias? -- Abdominal wall hernias do not always cause symptoms. When they do, they can cause some or all of these symptoms:  A bulge somewhere on the trunk of the body - This bulge can be so small that you don't even realize it's there.  Pain, especially when coughing, straining, or using nearby muscles  A pulling sensation around the bulge  Nausea or vomiting if part of the intestine is blocked in the hernia  Abdominal wall hernias can balloon out and form a sac. That sac can hold a loop of intestine or a piece of fat that should normally be tucked inside of the belly. This can be painful and even dangerous if the tissue in the hernia gets trapped and unable to slide back into the belly. When this happens, the tissue does not get enough blood, so it can become swollen or even die (figure 2).  Should I see a doctor or nurse? -- Yes. See a doctor or nurse if you have any of the symptoms of a hernia. In most cases, doctors can diagnose a hernia just by doing an exam. During the exam, the doctor might ask you to cough or bear down while pressing on your " "hernia. This might be uncomfortable, but it is necessary to find the source of the problem.  Most of the time, the contents of the hernia can be \"reduced,\" or gently pushed back into the belly. But sometimes, the hernia gets trapped and won't go back in. If that happens, the tissue that is trapped can get damaged.  If you develop severe pain around a hernia bulge or feel sick, call your doctor or surgeon right away.  How are hernias treated? -- Not all hernias need treatment right away. But many do need to be repaired with surgery.  Surgeons can repair most hernias in 1 of 2 ways. The right surgery for you depends on the size of your hernia, where on the abdominal wall it is, whether this is the first time it is getting repaired, what your general health is like, and your surgeon's experience.  The 2 types of surgery are:  Open surgery - During an open surgery, the doctor makes 1 large cut near the hernia to repair it.  Minimally invasive surgery - \"Minimally invasive\" surgery lets the doctor make smaller cuts in the skin. They insert long, thin tools through the cuts. One of the tools has a camera (called a \"laparoscope\") on the end, which sends pictures to a TV screen. The doctor can look at the screen to see inside the body. Then, they use the long tools to do the surgery. They can control the tools directly, or with the help of a robot (this is called \"robot-assisted\" surgery).  If your hernia has reduced the blood supply to a loop of intestine, your doctor might need to remove that piece of intestine. Usually, they will then sew the intestine back together.  The recovery and aftercare for each type of hernia repair is different. Your doctor or nurse can tell you what to expect after your surgery.  All topics are updated as new evidence becomes available and our peer review process is complete.  This topic retrieved from [employer] on: Jan 11, 2024.  Topic 27865 Version 10.0  Release: 31.6.4 - C32.10  © 2024 " UpToDate, Inc. and/or its affiliates. All rights reserved.  figure 1: Abdominal wall hernias     Abdominal wall hernias can happen in different parts of the torso:  Incisional hernias happen along incisions from surgery.  Umbilical hernias happen at the belly button.  Epigastric hernias happen in the midline above the belly button.  Spigelian hernias happen to the left or right of the midline, where 2 layers of muscle meet.  Lumbar hernias (not shown) happen at the back.  Inguinal hernias happen in the groin area.  Femoral hernias happen where the thigh joins the torso.  Graphic 82938 Version 4.0  figure 2: Intestines bulging through hernia     In some cases, a loop of intestine can poke through a hernia. Often, surgeons can push the loop of intestine back in. But if the loop gets trapped or does not get enough blood, surgeons sometimes have to remove it and reconnect the 2 ends of intestine that are left.  Graphic 84314 Version 3.0  Consumer Information Use and Disclaimer   Disclaimer: This generalized information is a limited summary of diagnosis, treatment, and/or medication information. It is not meant to be comprehensive and should be used as a tool to help the user understand and/or assess potential diagnostic and treatment options. It does NOT include all information about conditions, treatments, medications, side effects, or risks that may apply to a specific patient. It is not intended to be medical advice or a substitute for the medical advice, diagnosis, or treatment of a health care provider based on the health care provider's examination and assessment of a patient's specific and unique circumstances. Patients must speak with a health care provider for complete information about their health, medical questions, and treatment options, including any risks or benefits regarding use of medications. This information does not endorse any treatments or medications as safe, effective, or approved for treating a  "specific patient. UpToDate, Inc. and its affiliates disclaim any warranty or liability relating to this information or the use thereof.The use of this information is governed by the Terms of Use, available at https://www.woltersGeoloqiuwer.com/en/know/clinical-effectiveness-terms. 2024© UpToDate, Inc. and its affiliates and/or licensors. All rights reserved.  Copyright   © 2024 UpToDate, Inc. and/or its affiliates. All rights reserved.          INFORMATION FOR PATIENTS  Dr. Patrick is a member of the Abdominal Core Health Quality Collaborative ([facility]) registry and routinely follows all of his hernia patients in this way.  Please see the information below. If you receive any email surveys from the [facility], please respond to these so that we can follow how you are doing after surgery.    Please take advantage of the information on the [facility] website regarding rehabilitation before and after abdominal core surgery. This information can be found by going to [email or web address], then by clicking the \"Patients\" heading, then clicking \"Abdominal Core Surgery Rehab,\" or by going directly to [email or web address]    Additionally, there is an [facility] mobile lisa that provides a wealth of information regarding postoperative recovery including stretching, modification for activities of daily living, and exercises for core strength. This can be found by going to the Lisa Store or Google Play store and searching \"[facility]\".    This information sheet tells you how your surgeon’s participation in the Abdominal Core Health Quality  Collaborative ([facility]) aims to improve care for you and many other patients with hernia disease and diseases of  the abdominal wall or abdominal core. Your surgeon will include your personal health information (such as your  name, birthdate, address, and health care treatment) in the [facility] Health Quality Collaborative  confidential, secure data registry unless you request (as " explained below) that your personal health information  not be included. We hope you will allow your information to be included because doing so will help improve the  quality of care for patients with abdominal core health issues such as hernia, tumors, and infections and help  improve the ways surgeons prevent hernias from forming.     What is the Abdominal Core Health Quality Collaborative ([facility])?  ->The [facility] is a large group of surgeons committed to improving the quality of care for patients who have  abdominal core health problems and often undergo surgery for those problems. These surgeons are also  committed to reducing the chance of abdominal core health problems from forming in the first place.     How does it work?  -> During the routine care of your abdominal core health problem, your surgeon collects information about you  and how you do after surgery as part of your confidential health care records.  -> Your surgeon will input information about you and your surgery into a confidential, secure registry maintained  by the [facility]. Only your surgeon, the [facility] , and, in very limited circumstances, the [place] Food and  Drug Administration (FDA) will have access to any information that identifies you.  -> Your identifiable personal health information (such as your name, birthdate, address, and health care treatment  information) must be entered into the registry in order to make it useful for your surgeon’s and the [facility]’s  quality improvement efforts and so that the scientific validity of registry data can be tested. If available, your  surgeon will also enter your email address, cellular phone number, and mailing address. Your personal health  information, in an anonymous form (meaning your identity cannot be matched to your information), is combined  with anonymous information from hundreds (or thousands) of hernia patients like you so that it can be used by  those interested in improving hernia  care other than your surgeon and the State mental health facility.  -> Your data may be provided to the FDA for the purpose of informing the use of particular medical devices used to  care for patients with abdominal core problems and the prevention of those problems. Data provided to the FDA is  typically submitted in a de-identified form, although occasionally FDA may request access to your confidential  health information under its regulatory authority to monitor public health. This information may not be  completely anonymous; however, it will be kept in a secure location and any public reports or publications that are  generated from the registry for FDA purposes will not contain information that will allow you to be personally  identified. Any release of patient protected health information will only be performed consistent with applicable  local, state and federal laws, regulations and institutional policies.  -> State mental health facility information, in completely anonymous form, may be used by multiple groups interested in abdominal  core health problems including health care providers, hospitals, industry, insurance companies and researchers.  -> There is no monetary compensation to you for having your information included in the [facility] registry.  -> We anticipate about 9,000 to 12,000 patients’ information being entered into the registry each year, although  the number of patients may increase as more surgeons participate in the registry.  -> The State mental health facility will keep your personal health information in the registry for at least 15 years to help know what  happens after your hernia operation or other surgery or treatment on a long term basis. When your information is  no longer maintained as part of the registry, it will be deleted in a secure manner consistent with applicable  privacy and patient information requirements. Your personal health information in a de-identified (anonymous)  form may be maintained indefinitely by those who have used the  anonymous registry data.     How does this benefit patients like me?  -> The information gathered from many surgeons and patients is much better than information a single surgeon has  about abdominal core health problems with regards to best technique and best use of mesh for particular patients  and particular abdominal core health issues.  -> This information is used to improve the quality of care by finding the best ways to minimize complications, pain,  and the chance of your problem coming back.  -> Although the [facility] is not a research study, having your information included in the [facility] registry can make  you eligible for future research studies. You may be contacted by your surgeon or the [facility] if you are eligible for  future research studies, and you can find more information on [email or web address].     What are the risks involved?  -> Only your surgeon, the facility where your surgery occurred, the [facility] and in rare instances FDA will have  access to your personal health information input into the registry in an identifiable form. The main risk to you for  having your information entered into the [facility] registry is that your personal health information is  unintentionally disclosed to persons who are not authorized to know your information. Multiple industry-standard  safeguards are in place to prevent this, similar to the safeguards hospitals use to protect confidential health  information during routine care of patients.  -> No system that protects confidential personal health information is 100% effective. In the unlikely event that  your information is exposed, you will be personally notified of the incident and appropriate actions will be taken to  limit potential risks to you.  -> Your participation in the registry is completely voluntary. Not including your information in the [facility] registry  will not have an impact on your care.  -> If you do not wish to have your information included in the [facility] registry  for quality improvement in your  care, please inform your surgeon or contact [facility] (see information, below; If information already has been  collected, it will remain in the registry, but no new information will be collected after 30 days of your notifying  your surgeon or [facility] of your decision not to participate).     What can I do to help?  -> A very important part of your surgeon’s care of you and of the [facility] is finding out how your abdominal core  affects your life before and after surgery.  -> In addition to clinic visits with your surgeon to follow your progress, your surgeon and the [facility] may use  different ways to follow your progress after surgery including phone calls, email, text messages, or regular mail. If  you provide your email address or cellular telephone number, your surgeon and the [facility] may send you emails  or a text messages with a link to a confidential questionnaire for you to complete. You can choose not to receive  emails or text messages as described below. Completed questionnaires become part of the anonymous data that  [facility] uses to improve the quality of care for hernia patients. The email will come from [email or web address]; please don’t  delete or ‘junk filter’ this email.  -> You may be asked to complete a follow up questionnaire a few times over the next several years which helps  your surgeon and the [facility] see how you are doing.     Will I receive email and other electronic communication?  -> Unless you choose not to participate in the [facility] (see below), emails and text messages asking you to complete  an [facility] questionnaire may be sent to you in an unencrypted format and will have no additional personal information  about you, other than possibly that you are a patient of your surgeon (the questionnaire itself will be completed on a  secure portal to the [facility] registry).  -> In deciding if you want unencrypted emails or unencrypted text messages to be sent to you, you should consider  that  communication of protected health information by unencrypted email or unencrypted text message involves  potential privacy and security risks. Specifically, unencrypted email and text message transmissions can be  intercepted in transmission or misdirected, allowing third parties to view the information. You should also consider  that your cellular telephone service provider may impose a data or other charge on you to receive a text message.  You should consider asking that any communication of sensitive information be completed by telephone or mail. If  you do not wish to receive email or text messages concerning the [facility] and questionnaires about your hernia,  please inform your surgeon or contact [facility] (see information, below).     What if I have questions or choose not to participate in the [facility]?  -> Should you have any questions, your surgeon can help provide answers for you. General information, registry  updates, and progress of the [facility] can be found at [email or web address]. You can also send an email to  [email or web address] or call [phone] any time with questions about the registry.  -> If you do not want your personal health information included in the [facility] database or you do not want to  receive emails, text messages, or other communications from [facility], please inform your surgeon or contact  [facility] (by email to [email or web address] or call [phone]).

## 2025-02-19 ENCOUNTER — APPOINTMENT (OUTPATIENT)
Dept: SURGERY | Facility: CLINIC | Age: 75
End: 2025-02-19
Payer: MEDICARE

## 2025-02-19 VITALS
HEART RATE: 67 BPM | DIASTOLIC BLOOD PRESSURE: 95 MMHG | RESPIRATION RATE: 16 BRPM | BODY MASS INDEX: 22.5 KG/M2 | HEIGHT: 63 IN | WEIGHT: 127 LBS | SYSTOLIC BLOOD PRESSURE: 170 MMHG

## 2025-02-19 DIAGNOSIS — K43.5 PARASTOMAL HERNIA WITHOUT OBSTRUCTION OR GANGRENE: ICD-10-CM

## 2025-02-19 PROCEDURE — 1159F MED LIST DOCD IN RCRD: CPT | Performed by: SURGERY

## 2025-02-19 PROCEDURE — 99203 OFFICE O/P NEW LOW 30 MIN: CPT | Performed by: SURGERY

## 2025-02-19 PROCEDURE — 3008F BODY MASS INDEX DOCD: CPT | Performed by: SURGERY

## 2025-02-19 PROCEDURE — G2211 COMPLEX E/M VISIT ADD ON: HCPCS | Performed by: SURGERY

## 2025-02-19 PROCEDURE — 1126F AMNT PAIN NOTED NONE PRSNT: CPT | Performed by: SURGERY

## 2025-02-19 RX ORDER — VIT C/E/ZN/COPPR/LUTEIN/ZEAXAN 250MG-90MG
25 CAPSULE ORAL DAILY
COMMUNITY

## 2025-02-19 RX ORDER — ACETAMINOPHEN 500 MG
TABLET ORAL
COMMUNITY

## 2025-02-19 ASSESSMENT — PAIN SCALES - GENERAL: PAINLEVEL_OUTOF10: 0-NO PAIN

## 2025-07-08 NOTE — PROGRESS NOTES
"Dary García  [id number]  07/08/25  2:52 PM    HPI/Subjective:  Dary García is a 74 y.o. female with history of ileal conduit who presents for follow up regarding parastomal hernia(s).     From a medical standpoint, they have history of HLD, HTN, CKD3, bladder cancer, vitamin D deficiency, thyroid nodules    At our last visit, we discussed that she was not having significant difficulty pouching or other bothersome symptoms and recommended re-evaluation of symptoms in 6 months or sooner if needed.     Feels that hernia has grown a little bit. No significant pain. Taking a bowel regimen. No bowel obstructive symptoms. Gets 3 days out of a pouch.       Objective:      7/14/2022     9:13 AM 8/19/2022     9:26 AM 1/24/2023     9:17 AM 3/3/2023     7:15 AM 1/25/2024     8:07 AM 1/28/2025     8:36 AM 2/19/2025     9:16 AM   Vitals   Systolic 145 120 139 134 158 158 170   Diastolic 83 87 97 89 94 88 95   BP Location       Left arm   Heart Rate 68 70 73 82 74 75 67   Temp 36.4 °C (97.5 °F)  36.4 °C (97.5 °F)  36.5 °C (97.7 °F)     Resp       16   Height 1.575 m (5' 2\")  1.575 m (5' 2\")  1.588 m (5' 2.5\")  1.59 m (5' 2.6\")   Weight (lb) 119.5  123 125 131  127   BMI 21.86 kg/m2  22.5 kg/m2 22.86 kg/m2 23.58 kg/m2  22.79 kg/m2   BSA (m2) 1.54 m2  1.56 m2 1.57 m2 1.62 m2  1.6 m2   Visit Report     Report Report Report     Physical Exam  Vitals reviewed. Exam conducted with a chaperone present.   Constitutional:       General: She is not in acute distress.     Appearance: Normal appearance. She is not ill-appearing.   HENT:      Head: Normocephalic.      Mouth/Throat:      Mouth: Mucous membranes are moist.     Eyes:      Extraocular Movements: Extraocular movements intact.      Pupils: Pupils are equal, round, and reactive to light.       Cardiovascular:      Rate and Rhythm: Normal rate and regular rhythm.      Pulses: Normal pulses.      Heart sounds: Normal heart sounds. No murmur heard.  Pulmonary:      Effort: " Pulmonary effort is normal. No respiratory distress.      Breath sounds: Normal breath sounds. No wheezing, rhonchi or rales.   Chest:      Chest wall: No tenderness.   Abdominal:      General: There is no distension.      Palpations: There is no mass.      Tenderness: There is no abdominal tenderness. There is no guarding or rebound.      Hernia: A hernia is present.      Comments: Ostomy appliance in place     Musculoskeletal:         General: No swelling or deformity.      Cervical back: Neck supple. No rigidity.      Right lower leg: No edema.      Left lower leg: No edema.     Skin:     General: Skin is warm.      Coloration: Skin is not jaundiced or pale.     Neurological:      General: No focal deficit present.      Mental Status: She is alert and oriented to person, place, and time. Mental status is at baseline.     Psychiatric:         Mood and Affect: Mood normal.         Behavior: Behavior normal.         Thought Content: Thought content normal.         Judgment: Judgment normal.            Assessment/Plan:  Dary García is a 74 y.o. female who presents for follow up regarding parastomal hernia(s) related to her ileal conduit. At her last visit she was not particularly symptomatic and opted for a watchful waiting approach.    Her symptoms are relatively stable from her last visit.  She thinks her hernia might be a little bit bigger, but it is not causing her significant pain.  She is able to pouch and get 3 days out of an appliance without much difficulty.  She has not had any symptoms concerning for bowel obstruction.  Overall, I do not think she has any strong indication for a parastomal hernia repair at this time, and she favors nonoperative management at this point.    Plan will be to see them again in 6 months for reevaluation after her CT scan, or sooner as needed.    Of note, I am requesting a  modifier for this visit, given that I am going to be the person providing longitudinal  management of this patient's parastomal hernia, which we are currently following in a watchful waiting fashion.    Patrick Patrick MD  Assistant Professor of Surgery  Division of General Surgery  Department of Surgery

## 2025-07-08 NOTE — PATIENT INSTRUCTIONS
"Patient Education     Abdominal wall hernias   The Basics   Written by the doctors and editors at [facility]   What is an abdominal wall hernia? -- The internal organs and tissues in the belly are held in place by a tough outer wall of tissue called the \"abdominal wall.\" An abdominal hernia is an area in that wall that is weak or torn. Often, when there is a hernia, organs or tissues that are normally held in place by the abdominal wall bulge or stick out through the weak or torn spot.  The size of the bulge can change depending on how much pressure is put on the abdominal wall. For example, straining when coughing or having a bowel movement can make a hernia look bigger. Lying down overnight can make the hernia look smaller, or even disappear, in the morning.  There are many different kinds of abdominal wall hernias (figure 1).  What are the symptoms of abdominal wall hernias? -- Abdominal wall hernias do not always cause symptoms. When they do, they can cause some or all of these symptoms:  A bulge somewhere on the trunk of the body - This bulge can be so small that you don't even realize it's there.  Pain, especially when coughing, straining, or using nearby muscles  A pulling sensation around the bulge  Nausea or vomiting if part of the intestine is blocked in the hernia  Abdominal wall hernias can balloon out and form a sac. That sac can hold a loop of intestine or a piece of fat that should normally be tucked inside of the belly. This can be painful and even dangerous if the tissue in the hernia gets trapped and unable to slide back into the belly. When this happens, the tissue does not get enough blood, so it can become swollen or even die (figure 2).  Should I see a doctor or nurse? -- Yes. See a doctor or nurse if you have any of the symptoms of a hernia. In most cases, doctors can diagnose a hernia just by doing an exam. During the exam, the doctor might ask you to cough or bear down while pressing on your " "hernia. This might be uncomfortable, but it is necessary to find the source of the problem.  Most of the time, the contents of the hernia can be \"reduced,\" or gently pushed back into the belly. But sometimes, the hernia gets trapped and won't go back in. If that happens, the tissue that is trapped can get damaged.  If you develop severe pain around a hernia bulge or feel sick, call your doctor or surgeon right away.  How are hernias treated? -- Not all hernias need treatment right away. But many do need to be repaired with surgery.  Surgeons can repair most hernias in 1 of 2 ways. The right surgery for you depends on the size of your hernia, where on the abdominal wall it is, whether this is the first time it is getting repaired, what your general health is like, and your surgeon's experience.  The 2 types of surgery are:  Open surgery - During an open surgery, the doctor makes 1 large cut near the hernia to repair it.  Minimally invasive surgery - \"Minimally invasive\" surgery lets the doctor make smaller cuts in the skin. They insert long, thin tools through the cuts. One of the tools has a camera (called a \"laparoscope\") on the end, which sends pictures to a TV screen. The doctor can look at the screen to see inside the body. Then, they use the long tools to do the surgery. They can control the tools directly, or with the help of a robot (this is called \"robot-assisted\" surgery).  If your hernia has reduced the blood supply to a loop of intestine, your doctor might need to remove that piece of intestine. Usually, they will then sew the intestine back together.  The recovery and aftercare for each type of hernia repair is different. Your doctor or nurse can tell you what to expect after your surgery.  All topics are updated as new evidence becomes available and our peer review process is complete.  This topic retrieved from [employer] on: Jan 11, 2024.  Topic 15176 Version 10.0  Release: 31.6.4 - C32.10  © 2024 " UpToDate, Inc. and/or its affiliates. All rights reserved.  figure 1: Abdominal wall hernias     Abdominal wall hernias can happen in different parts of the torso:  Incisional hernias happen along incisions from surgery.  Umbilical hernias happen at the belly button.  Epigastric hernias happen in the midline above the belly button.  Spigelian hernias happen to the left or right of the midline, where 2 layers of muscle meet.  Lumbar hernias (not shown) happen at the back.  Inguinal hernias happen in the groin area.  Femoral hernias happen where the thigh joins the torso.  Graphic 63537 Version 4.0  figure 2: Intestines bulging through hernia     In some cases, a loop of intestine can poke through a hernia. Often, surgeons can push the loop of intestine back in. But if the loop gets trapped or does not get enough blood, surgeons sometimes have to remove it and reconnect the 2 ends of intestine that are left.  Graphic 21960 Version 3.0  Consumer Information Use and Disclaimer   Disclaimer: This generalized information is a limited summary of diagnosis, treatment, and/or medication information. It is not meant to be comprehensive and should be used as a tool to help the user understand and/or assess potential diagnostic and treatment options. It does NOT include all information about conditions, treatments, medications, side effects, or risks that may apply to a specific patient. It is not intended to be medical advice or a substitute for the medical advice, diagnosis, or treatment of a health care provider based on the health care provider's examination and assessment of a patient's specific and unique circumstances. Patients must speak with a health care provider for complete information about their health, medical questions, and treatment options, including any risks or benefits regarding use of medications. This information does not endorse any treatments or medications as safe, effective, or approved for treating a  "specific patient. UpToDate, Inc. and its affiliates disclaim any warranty or liability relating to this information or the use thereof.The use of this information is governed by the Terms of Use, available at https://www.woltersAmerityreuwer.com/en/know/clinical-effectiveness-terms. 2024© UpToDate, Inc. and its affiliates and/or licensors. All rights reserved.  Copyright   © 2024 UpToDate, Inc. and/or its affiliates. All rights reserved.          INFORMATION FOR PATIENTS  Dr. Patrick is a member of the Abdominal Core Health Quality Collaborative ([facility]) registry and routinely follows all of his hernia patients in this way.  Please see the information below. If you receive any email surveys from the [facility], please respond to these so that we can follow how you are doing after surgery.    Please take advantage of the information on the [facility] website regarding rehabilitation before and after abdominal core surgery. This information can be found by going to [email or web address], then by clicking the \"Patients\" heading, then clicking \"Abdominal Core Surgery Rehab,\" or by going directly to [email or web address]    Additionally, there is an [facility] mobile lisa that provides a wealth of information regarding postoperative recovery including stretching, modification for activities of daily living, and exercises for core strength. This can be found by going to the Lisa Store or Google Play store and searching \"[facility]\".    This information sheet tells you how your surgeon’s participation in the Abdominal Core Health Quality  Collaborative ([facility]) aims to improve care for you and many other patients with hernia disease and diseases of  the abdominal wall or abdominal core. Your surgeon will include your personal health information (such as your  name, birthdate, address, and health care treatment) in the Abdominal [facility] Quality Collaborative  confidential, secure data registry unless you request (as " explained below) that your personal health information  not be included. We hope you will allow your information to be included because doing so will help improve the  quality of care for patients with abdominal core health issues such as hernia, tumors, and infections and help  improve the ways surgeons prevent hernias from forming.     What is the Abdominal Core Health Quality Collaborative ([facility])?  ->The [facility] is a large group of surgeons committed to improving the quality of care for patients who have  abdominal core health problems and often undergo surgery for those problems. These surgeons are also  committed to reducing the chance of abdominal core health problems from forming in the first place.     How does it work?  -> During the routine care of your abdominal core health problem, your surgeon collects information about you  and how you do after surgery as part of your confidential health care records.  -> Your surgeon will input information about you and your surgery into a confidential, secure registry maintained  by the [facility]. Only your surgeon, the [facility] , and, in very limited circumstances, the [place] Food and  Drug Administration (FDA) will have access to any information that identifies you.  -> Your identifiable personal health information (such as your name, birthdate, address, and health care treatment  information) must be entered into the registry in order to make it useful for your surgeon’s and the [facility]’s  quality improvement efforts and so that the scientific validity of registry data can be tested. If available, your  surgeon will also enter your email address, cellular phone number, and mailing address. Your personal health  information, in an anonymous form (meaning your identity cannot be matched to your information), is combined  with anonymous information from hundreds (or thousands) of hernia patients like you so that it can be used by  those interested in improving hernia  care other than your surgeon and the [facility].  -> Your data may be provided to the FDA for the purpose of informing the use of particular medical devices used to  care for patients with abdominal core problems and the prevention of those problems. Data provided to the FDA is  typically submitted in a de-identified form, although occasionally FDA may request access to your confidential  health information under its regulatory authority to monitor public health. This information may not be  completely anonymous; however, it will be kept in a secure location and any public reports or publications that are  generated from the registry for FDA purposes will not contain information that will allow you to be personally  identified. Any release of patient protected health information will only be performed consistent with applicable  local, state and federal laws, regulations and institutional policies.  -> [facility] information, in completely anonymous form, may be used by multiple groups interested in abdominal  core health problems including health care providers, hospitals, industry, insurance companies and researchers.  -> There is no monetary compensation to you for having your information included in the [facility] registry.  -> We anticipate about 9,000 to 12,000 patients’ information being entered into the registry each year, although  the number of patients may increase as more surgeons participate in the registry.  -> The [facility] will keep your personal health information in the registry for at least 15 years to help know what  happens after your hernia operation or other surgery or treatment on a long term basis. When your information is  no longer maintained as part of the registry, it will be deleted in a secure manner consistent with applicable  privacy and patient information requirements. Your personal health information in a de-identified (anonymous)  form may be maintained indefinitely by those who have used the  anonymous registry data.     How does this benefit patients like me?  -> The information gathered from many surgeons and patients is much better than information a single surgeon has  about abdominal core health problems with regards to best technique and best use of mesh for particular patients  and particular abdominal core health issues.  -> This information is used to improve the quality of care by finding the best ways to minimize complications, pain,  and the chance of your problem coming back.  -> Although the [facility] is not a research study, having your information included in the [facility] registry can make  you eligible for future research studies. You may be contacted by your surgeon or the [facility] if you are eligible for  future research studies, and you can find more information on [email or web address].     What are the risks involved?  -> Only your surgeon, the facility where your surgery occurred, the [facility] and in rare instances FDA will have  access to your personal health information input into the registry in an identifiable form. The main risk to you for  having your information entered into the [facility] registry is that your personal health information is  unintentionally disclosed to persons who are not authorized to know your information. Multiple industry-standard  safeguards are in place to prevent this, similar to the safeguards hospitals use to protect confidential health  information during routine care of patients.  -> No system that protects confidential personal health information is 100% effective. In the unlikely event that  your information is exposed, you will be personally notified of the incident and appropriate actions will be taken to  limit potential risks to you.  -> Your participation in the registry is completely voluntary. Not including your information in the [facility] registry  will not have an impact on your care.  -> If you do not wish to have your information included in the [facility] registry  for quality improvement in your  care, please inform your surgeon or contact [facility] (see information, below; If information already has been  collected, it will remain in the registry, but no new information will be collected after 30 days of your notifying  your surgeon or [facility] of your decision not to participate).     What can I do to help?  -> A very important part of your surgeon’s care of you and of the [facility] is finding out how your abdominal core  affects your life before and after surgery.  -> In addition to clinic visits with your surgeon to follow your progress, your surgeon and the [facility] may use  different ways to follow your progress after surgery including phone calls, email, text messages, or regular mail. If  you provide your email address or cellular telephone number, your surgeon and the [facility] may send you emails  or a text messages with a link to a confidential questionnaire for you to complete. You can choose not to receive  emails or text messages as described below. Completed questionnaires become part of the anonymous data that  [facility] uses to improve the quality of care for hernia patients. The email will come from [email or web address]; please don’t  delete or ‘junk filter’ this email.  -> You may be asked to complete a follow up questionnaire a few times over the next several years which helps  your surgeon and the [facility] see how you are doing.     Will I receive email and other electronic communication?  -> Unless you choose not to participate in the [facility] (see below), emails and text messages asking you to complete  an [facility] questionnaire may be sent to you in an unencrypted format and will have no additional personal information  about you, other than possibly that you are a patient of your surgeon (the questionnaire itself will be completed on a  secure portal to the [facility] registry).  -> In deciding if you want unencrypted emails or unencrypted text messages to be sent to you, you should consider  that  communication of protected health information by unencrypted email or unencrypted text message involves  potential privacy and security risks. Specifically, unencrypted email and text message transmissions can be  intercepted in transmission or misdirected, allowing third parties to view the information. You should also consider  that your cellular telephone service provider may impose a data or other charge on you to receive a text message.  You should consider asking that any communication of sensitive information be completed by telephone or mail. If  you do not wish to receive email or text messages concerning the [facility] and questionnaires about your hernia,  please inform your surgeon or contact [facility] (see information, below).     What if I have questions or choose not to participate in the [facility]?  -> Should you have any questions, your surgeon can help provide answers for you. General information, registry  updates, and progress of the [facility] can be found at [email or web address]. You can also send an email to  [email or web address] or call [phone] any time with questions about the registry.  -> If you do not want your personal health information included in the [facility] database or you do not want to  receive emails, text messages, or other communications from [facility], please inform your surgeon or contact  [facility] (by email to [email or web address] or call [phone]).

## 2025-07-16 ENCOUNTER — APPOINTMENT (OUTPATIENT)
Dept: SURGERY | Facility: CLINIC | Age: 75
End: 2025-07-16
Payer: MEDICARE

## 2025-07-28 ENCOUNTER — APPOINTMENT (OUTPATIENT)
Dept: SURGERY | Facility: CLINIC | Age: 75
End: 2025-07-28
Payer: MEDICARE

## 2025-07-28 VITALS
WEIGHT: 131 LBS | BODY MASS INDEX: 23.5 KG/M2 | HEART RATE: 78 BPM | SYSTOLIC BLOOD PRESSURE: 142 MMHG | DIASTOLIC BLOOD PRESSURE: 87 MMHG | RESPIRATION RATE: 16 BRPM

## 2025-07-28 DIAGNOSIS — K43.5 PARASTOMAL HERNIA WITHOUT OBSTRUCTION OR GANGRENE: Primary | ICD-10-CM

## 2025-07-28 PROCEDURE — 1126F AMNT PAIN NOTED NONE PRSNT: CPT | Performed by: SURGERY

## 2025-07-28 PROCEDURE — 1159F MED LIST DOCD IN RCRD: CPT | Performed by: SURGERY

## 2025-07-28 PROCEDURE — 99212 OFFICE O/P EST SF 10 MIN: CPT | Performed by: SURGERY

## 2025-07-28 PROCEDURE — G2211 COMPLEX E/M VISIT ADD ON: HCPCS | Performed by: SURGERY

## 2025-07-28 ASSESSMENT — PAIN SCALES - GENERAL: PAINLEVEL_OUTOF10: 0-NO PAIN

## 2026-02-02 ENCOUNTER — APPOINTMENT (OUTPATIENT)
Dept: UROLOGY | Facility: CLINIC | Age: 76
End: 2026-02-02
Payer: MEDICARE

## 2026-02-04 ENCOUNTER — APPOINTMENT (OUTPATIENT)
Dept: SURGERY | Facility: CLINIC | Age: 76
End: 2026-02-04
Payer: MEDICARE

## (undated) DEVICE — MEDI-VAC NON-CONDUCTIVE SUCTION TUBING: Brand: CARDINAL HEALTH

## (undated) DEVICE — CONMED CHANNEL MASTER COMBINATION CLEANING BRUSH, 230 CM X 2.0 MM: Brand: CONMED

## (undated) DEVICE — 2000CC GUARDIAN II: Brand: GUARDIAN

## (undated) DEVICE — JELLY,LUBE,STERILE,FLIP TOP,TUBE,2-OZ: Brand: MEDLINE

## (undated) DEVICE — CABLE ENDOSCP L10FT ACT DISP

## (undated) DEVICE — OPEN-END URETERAL CATHETER: Brand: COOK

## (undated) DEVICE — DBD-PACK,CYSTOSCOPY,PK VI,AURORA: Brand: MEDLINE

## (undated) DEVICE — SYRINGE MED 10ML LUERLOCK TIP W/O SFTY DISP

## (undated) DEVICE — GUIDEWIRE ENDOSCP L150CM DIA0.038IN TIP L3CM STD PTFE ANG

## (undated) DEVICE — TRAY PREP DRY W/ PREM GLV 2 APPL 6 SPNG 2 UNDPD 1 OVERWRAP

## (undated) DEVICE — GUIDEWIRE URO L150CM DIA0.035IN TAPR 8CM STR TIP STD SHFT

## (undated) DEVICE — Device

## (undated) DEVICE — ANGLED TIP URETERAL CATHETER WITH BENTSON PTFE WIRE GUIDE: Brand: ANGLED TIP

## (undated) DEVICE — RUTNER UNIVERSAL WEDGE CATHETER: Brand: RUTNER

## (undated) DEVICE — GOWN,AURORA,NONREINFORCED,LARGE: Brand: MEDLINE

## (undated) DEVICE — COVER FT SWCH W15XL17IN GRY ALL OEC SYS

## (undated) DEVICE — ELECTRODE PT RET AD L9FT HI MOIST COND ADH HYDRGEL CORDED

## (undated) DEVICE — MULTI-WIRE STONE SWEEPING DEVICE: Brand: LESLIE PARACHUTE

## (undated) DEVICE — GOWN,SIRUS,POLYRNF,BRTHSLV,XLN/XL,20/CS: Brand: MEDLINE

## (undated) DEVICE — LUBRICANT SURG JELLY ST BACTER TUBE 4.25OZ

## (undated) DEVICE — CANNULA CAPNOGRAPHY AD O2 TBNG L7FT FEM LUER STYL 4707F7725] SALTER LABS INC]

## (undated) DEVICE — PAD N ADH W3XL4IN POLY COT SFT PERF FLM EASILY CUT ABSRB

## (undated) DEVICE — SONY PRINTER PAPER

## (undated) DEVICE — BAG DRAINAGE URIN LIGEMAN W/ ADPT SUCTION HOSE CYSTO URO

## (undated) DEVICE — TOWEL,OR,DSP,ST,BLUE,STD,4/PK,20PK/CS: Brand: MEDLINE

## (undated) DEVICE — STERILE LATEX POWDER-FREE SURGICAL GLOVESWITH NITRILE COATING: Brand: PROTEXIS

## (undated) DEVICE — LABEL MED MINI W/ MARKER

## (undated) DEVICE — GLOVE ORANGE PI 8   MSG9080

## (undated) DEVICE — SET,IRRIGATION,CYSTO/TUR: Brand: MEDLINE

## (undated) DEVICE — TOTAL TRAY, DB, 100% SILI FOLEY, 16FR 10: Brand: MEDLINE

## (undated) DEVICE — GRASPING FORCEPS: Brand: COOK

## (undated) DEVICE — CONTAINER,SPECIMEN,OR STERILE,4OZ: Brand: MEDLINE

## (undated) DEVICE — ENDO CARRY-ON PROCEDURE KIT: Brand: ENDO CARRY-ON PROCEDURE KIT

## (undated) DEVICE — SINGLE ACTION PUMPING SYSTEM

## (undated) DEVICE — DUAL LUMEN URETERAL CATHETER